# Patient Record
Sex: FEMALE | Race: WHITE | NOT HISPANIC OR LATINO | Employment: OTHER | URBAN - METROPOLITAN AREA
[De-identification: names, ages, dates, MRNs, and addresses within clinical notes are randomized per-mention and may not be internally consistent; named-entity substitution may affect disease eponyms.]

---

## 2017-03-07 ENCOUNTER — TELEPHONE (OUTPATIENT)
Dept: FAMILY MEDICINE | Facility: CLINIC | Age: 82
End: 2017-03-07

## 2017-03-07 NOTE — TELEPHONE ENCOUNTER
----- Message from Cici Winters sent at 3/7/2017  8:20 AM CST -----  Contact: daughter  Daughter - Cristin Valdes - [phone] has scheduled appt with Dr Llanes for Tuesday 03 14 17 in [place] at 2pm but she is needing orders for [facility] to check her iron level/please call daughter to discuss

## 2017-03-08 NOTE — TELEPHONE ENCOUNTER
----- Message from Nuno Beach sent at 3/8/2017  2:37 PM CST -----  Contact: Daughter- Kelsea Aaoq- [phone]  Patient's daughter returning the nurse phone call.Thanks!

## 2017-03-08 NOTE — TELEPHONE ENCOUNTER
Spoke to pt daughter. Advised of procedure for first office visit. Pt daughter verbalized understanding.

## 2017-03-08 NOTE — TELEPHONE ENCOUNTER
Pt needs to be seen first to estab as [facility] pt. Will  Order labs at that time. Pt will be able to get labs prior to future appts.

## 2017-03-14 ENCOUNTER — OFFICE VISIT (OUTPATIENT)
Dept: FAMILY MEDICINE | Facility: CLINIC | Age: 82
End: 2017-03-14
Payer: MEDICARE

## 2017-03-14 VITALS
HEART RATE: 60 BPM | WEIGHT: 137.69 LBS | SYSTOLIC BLOOD PRESSURE: 152 MMHG | TEMPERATURE: 98 F | DIASTOLIC BLOOD PRESSURE: 74 MMHG | RESPIRATION RATE: 16 BRPM | HEIGHT: 62 IN | BODY MASS INDEX: 25.34 KG/M2

## 2017-03-14 DIAGNOSIS — E03.9 HYPOTHYROIDISM, UNSPECIFIED TYPE: ICD-10-CM

## 2017-03-14 DIAGNOSIS — I10 BENIGN ESSENTIAL HTN: Primary | ICD-10-CM

## 2017-03-14 DIAGNOSIS — F41.8 ANXIETY ASSOCIATED WITH DEPRESSION: ICD-10-CM

## 2017-03-14 DIAGNOSIS — D50.9 IRON DEFICIENCY ANEMIA, UNSPECIFIED IRON DEFICIENCY ANEMIA TYPE: ICD-10-CM

## 2017-03-14 DIAGNOSIS — K27.9 PUD (PEPTIC ULCER DISEASE): ICD-10-CM

## 2017-03-14 DIAGNOSIS — E55.9 VITAMIN D DEFICIENCY: ICD-10-CM

## 2017-03-14 DIAGNOSIS — E78.00 PURE HYPERCHOLESTEROLEMIA: ICD-10-CM

## 2017-03-14 PROCEDURE — 99213 OFFICE O/P EST LOW 20 MIN: CPT | Mod: S$GLB,,, | Performed by: INTERNAL MEDICINE

## 2017-03-14 PROCEDURE — 1160F RVW MEDS BY RX/DR IN RCRD: CPT | Mod: S$GLB,,, | Performed by: INTERNAL MEDICINE

## 2017-03-14 PROCEDURE — 1159F MED LIST DOCD IN RCRD: CPT | Mod: S$GLB,,, | Performed by: INTERNAL MEDICINE

## 2017-03-14 PROCEDURE — 1126F AMNT PAIN NOTED NONE PRSNT: CPT | Mod: S$GLB,,, | Performed by: INTERNAL MEDICINE

## 2017-03-14 PROCEDURE — 1157F ADVNC CARE PLAN IN RCRD: CPT | Mod: S$GLB,,, | Performed by: INTERNAL MEDICINE

## 2017-03-14 PROCEDURE — 99999 PR PBB SHADOW E&M-EST. PATIENT-LVL III: CPT | Mod: PBBFAC,,, | Performed by: INTERNAL MEDICINE

## 2017-03-14 RX ORDER — PRAVASTATIN SODIUM 40 MG/1
TABLET ORAL
Refills: 2 | COMMUNITY
Start: 2017-02-17 | End: 2017-04-18 | Stop reason: SDUPTHER

## 2017-03-14 RX ORDER — SERTRALINE HYDROCHLORIDE 50 MG/1
50 TABLET, FILM COATED ORAL DAILY
COMMUNITY
Start: 2017-03-12 | End: 2017-08-03 | Stop reason: SDUPTHER

## 2017-03-14 RX ORDER — FERROUS FUM/VIT C/B12-IF/FOLIC 110-0.5MG
CAPSULE ORAL
Refills: 5 | COMMUNITY
Start: 2017-02-25 | End: 2017-10-03

## 2017-03-14 RX ORDER — BLACK COHOSH ROOT 200 MG
500 CAPSULE ORAL 2 TIMES DAILY
Refills: 5 | COMMUNITY
Start: 2017-02-25 | End: 2017-06-08

## 2017-03-14 RX ORDER — FERROUS GLUCONATE 324(38)MG
1 TABLET ORAL 2 TIMES DAILY
Refills: 5 | COMMUNITY
Start: 2017-02-25 | End: 2017-06-08

## 2017-03-14 RX ORDER — ASPIRIN 81 MG/1
81 TABLET ORAL DAILY
COMMUNITY
End: 2017-03-14

## 2017-03-14 RX ORDER — CHOLECALCIFEROL (VITAMIN D3) 25 MCG
1000 TABLET ORAL DAILY
COMMUNITY

## 2017-03-14 NOTE — PROGRESS NOTES
Subjective:       Patient ID: Margarita Cruz is a 89 y.o. female.    Chief Complaint: Follow-up    HPI a  pleasant 89-year-old woman here with her daughter who is a nurse to get established with me here at the [facility].  She has a history of cognitive impairment hypertension hypercholesterolemia hypothyroidism recent peptic ulcer disease hospitalized discharged around 10/28/16; her PUD which was bleeding of note with her requiring 2 units of packed RBCs during that visit.  Suspected as having iron deficiency anemia with blood loss component as well; on omeprazole 20 mg daily; she's been advised not to take any NSAID use or alcohol except for rare wine; she was seen to me about 6-8 weeks ago and placed on ferry: Daily and ferrous gluconate twice a day for suspected on deficiency anemia.  She has no heartburn or belching or abdominal pain or hematochezia.  She is is on sertraline for anxiety and depression and this is been stable she has not been following her blood pressures at home but watches her salt.  She is not been exercising either.  Lipid levels with therapeutic.  Various diagnosis is with discussed with the patient and her daughter as well who is an RN.  Total time 2: 50 p.m. to 3:15 PM    Review of Systems   Constitutional: Negative for appetite change and fever.   HENT:        No history of seasonal ALLERGIES or perennial ALLERGIES   Eyes: Negative for discharge.   Respiratory: Negative for cough, chest tightness, shortness of breath and wheezing.    Cardiovascular: Negative for chest pain, palpitations and leg swelling.   Gastrointestinal: Negative for abdominal distention, abdominal pain, blood in stool, constipation, diarrhea, nausea and vomiting.        Denies melena as well   Endocrine:        HLP/hypothyroidism     Genitourinary: Negative for dysuria and hematuria.   Musculoskeletal: Negative for arthralgias and myalgias.   Skin: Negative for rash.   Allergic/Immunologic:         "Denies seasonal or perennial ALLERGIES.   Neurological: Negative for tremors, seizures and syncope.   Hematological: Negative for adenopathy.   Psychiatric/Behavioral:         anxiety or depression doing fine w sertraline.   Various diagnosis is clinic care were discussed with the patient as well as her daughter annika     Objective:      Vitals:    03/14/17 1422   BP: (!) 152/74   BP Location: Left arm   Patient Position: Sitting   Pulse: 60   Resp: 16   Temp: 97.7 °F (36.5 °C)   TempSrc: Oral   Weight: 62.5 kg (137 lb 10.8 oz)   Height: 5' 2" (1.575 m)     Body mass index is 25.18 kg/(m^2).    Physical Exam   Constitutional: She is oriented to person, place, and time. She appears well-developed and well-nourished.   HENT:   Head: Normocephalic and atraumatic.   Eyes: EOM are normal.   Neck: Normal range of motion. Neck supple. No thyromegaly present.   No bruits heard.   Cardiovascular: Normal rate, regular rhythm and normal heart sounds.  Exam reveals no gallop.    No murmur heard.  Pulmonary/Chest: Effort normal and breath sounds normal. No respiratory distress. She has no wheezes. She has no rales.   Abdominal: Soft. Bowel sounds are normal. She exhibits no distension. There is no tenderness. There is no rebound and no guarding.   Musculoskeletal: Normal range of motion. She exhibits no edema.   Lymphadenopathy:     She has no cervical adenopathy.   Neurological: She is alert and oriented to person, place, and time.   Moves all 4 extremities fine.   Skin: No rash noted.   Psychiatric: She has a normal mood and affect. Her behavior is normal. Thought content normal.   Vitals reviewed.      Assessment:       1. Benign essential HTN    2. PUD (peptic ulcer disease)    3. Iron deficiency anemia, unspecified iron deficiency anemia type    4. Vitamin D deficiency    5. Pure hypercholesterolemia    6. Hypothyroidism, unspecified type    7. Anxiety associated with depression        Plan:       Benign essential HTN; " moniotor her BP at home. < 2 Gm Na a day.  -     Iron and TIBC; Future; Expected date: 4/25/17  -     Basic metabolic panel; Future; Expected date: 3/14/17  -     Magnesium; Future; Expected date: 3/14/17    PUD (peptic ulcer disease); limit etoh; no NSAID's; cont omeprazole.  Recent hospitalization 9/27/16 through 1028/16 where she required 2 units of packed RBCs for bleeding peptic ulcer disease.              Blood loss contributing towards her anemia.  Placed on omeprazole 20 mg per day at discharge.    Iron deficiency anemia, unspecified iron deficiency anemia type  -     CBC auto differential; Future; Expected date: 3/14/17  -     Ferritin; Future; Expected date: 3/14/17  -     Iron and TIBC; Future; Expected date: 4/25/17    Vitamin D deficiency improved.    Pure hypercholesterolemia; low fat high fiber diet; on pravastatin.    Hypothyroidism, unspecified type; supplements w therapeutic levels; q 6 mos levels.    Anxiety associated with depression; stable w sertraline. Limit caffeine.  Regular weightbearing exercise will help

## 2017-03-14 NOTE — MR AVS SNAPSHOT
[facility]  [address]  [place] [address]  Phone: [phone]  Fax: [phone]                  Margarita Cruz   3/14/2017 2:00 PM   Office Visit    Description:  Female : 1928   Provider:  Bradley Llanes MD   Department:  [facility]           Reason for Visit     Follow-up           Diagnoses this Visit        Comments    Benign essential HTN    -  Primary     PUD (peptic ulcer disease)         Iron deficiency anemia, unspecified iron deficiency anemia type         Vitamin D deficiency         Pure hypercholesterolemia         Hypothyroidism, unspecified type         Anxiety associated with depression                To Do List           Future Appointments        Provider Department Dept Phone    2017 9:20 AM Bradley Llanes MD [facility] [phone]      Goals (5 Years of Data)     None      Follow-Up and Disposition     Return in about 3 weeks (around 2017) for Reassessment and go over her labs.    Follow-up and Disposition History      [facility]     [facility] -  Assistance  Registered nurses in the [facility] provide clinical advisement, health education, appointment booking, and other advisory services.  Call for this free service at [phone].             Medications           Message regarding Medications     Verify the changes and/or additions to your medication regime listed below are the same as discussed with your clinician today.  If any of these changes or additions are incorrect, please notify your healthcare provider.        STOP taking these medications     escitalopram (LEXAPRO) 5 MG Tab Take 5 mg by mouth.      lisinopril (PRINIVIL,ZESTRIL) 2.5 MG tablet Take 2.5 mg by mouth.      atorvastatin (LIPITOR) 10 MG tablet Take 10 mg by mouth.      ibuprofen (ADVIL,MOTRIN) 100 MG tablet Take 100 mg by mouth every 6 (six) hours as needed.      aspirin  "(ECOTRIN) 81 MG EC tablet Take 81 mg by mouth once daily.           Verify that the below list of medications is an accurate representation of the medications you are currently taking.  If none reported, the list may be blank. If incorrect, please contact your healthcare provider. Carry this list with you in case of emergency.           Current Medications     calcium carbonate (OS-MARGARITA) 600 mg (1,500 mg) Tab Take 500 mg by mouth 2 (two) times daily with meals.      FEROCON 110-0.5 mg capsule TAKE ONE CAPSULE BY MOUTH EVERY DAY WITH A MEAL    ferrous gluconate (FERGON) 324 MG tablet Take 1 tablet by mouth 2 (two) times daily.    losartan (COZAAR) 100 MG tablet Take 12.5 mg by mouth once daily.    multivit-iron-min-folic acid (MULTIVITAMIN-IRON-MINERALS-FOLIC ACID) 3,500-18-0.4 unit-mg-mg Chew Take by mouth.      omeprazole (PRILOSEC) 20 MG capsule Take 20 mg by mouth.      pravastatin (PRAVACHOL) 40 MG tablet TAKE 1 TABLET BY MOUTH NIGHLTY FOR 30 DAYS    sertraline (ZOLOFT) 50 MG tablet     VITAMIN C 500 MG tablet Take 500 mg by mouth 2 (two) times daily.    vitamin D 1000 units Tab Take 185 mg by mouth 2 (two) times daily.           Clinical Reference Information           Your Vitals Were     BP Pulse Temp Resp Height Weight    152/74 (BP Location: Left arm, Patient Position: Sitting) 60 97.7 °F (36.5 °C) (Oral) 16 5' 2" (1.575 m) 62.5 kg (137 lb 10.8 oz)    BMI                25.18 kg/m2          Blood Pressure          Most Recent Value    BP  (!)  152/74      Allergies as of 3/14/2017     No Known Allergies      Immunizations Administered on Date of Encounter - 3/14/2017     None      Orders Placed During Today's Visit     Future Labs/Procedures Expected by Expires    Basic metabolic panel  3/14/2017 5/13/2018    CBC auto differential  3/14/2017 5/13/2018    Ferritin  3/14/2017 5/13/2018    Magnesium  3/14/2017 5/13/2018    Iron and TIBC  4/25/2017 5/13/2018      [facility] Sign-Up     Activating your [facility] " account is as easy as 1-2-3!     1) Visit [email or web address], select Sign Up Now, enter this activation code and your date of birth, then select Next.  [email or web address]  Expires: 4/28/2017  2:08 PM      2) Create a username and password to use when you visit [facility] in the future and select a security question in case you lose your password and select Next.    3) Enter your e-mail address and click Sign Up!    Additional Information  If you have questions, please e-mail [email or web address] or call [phone] to talk to our [email or web address] staff. Remember, [facility] is NOT to be used for urgent needs. For medical emergencies, dial 911.         Language Assistance Services     ATTENTION: Language assistance services are available, free of charge. Please call [phone].      ATENCIÓN: Si habla ivet, tiene a hung disposición servicios gratuitos de asistencia lingüística. Llame al [phone].     [facility] Ý: N?u b?n nói Ti?ng Vi?t, có các d?ch v? h? tr? ngôn ng? mi?n phí dành cho b?n. G?i s? [phone].         [facility] complies with applicable Federal civil rights laws and does not discriminate on the basis of race, color, national origin, age, disability, or sex.

## 2017-03-17 ENCOUNTER — TELEPHONE (OUTPATIENT)
Dept: FAMILY MEDICINE | Facility: CLINIC | Age: 82
End: 2017-03-17

## 2017-03-17 NOTE — TELEPHONE ENCOUNTER
----- Message from Jenna Stephenson sent at 3/17/2017 10:52 AM CDT -----  Contact: Eneida Holguin w/[facility] [phone]  She is calling to let you know that this patient has not been there since 2014, so she does not have labs for her.  Thank you!

## 2017-03-21 ENCOUNTER — PATIENT OUTREACH (OUTPATIENT)
Dept: ADMINISTRATIVE | Facility: HOSPITAL | Age: 82
End: 2017-03-21

## 2017-03-21 NOTE — LETTER
March 21, 2017    Margarita Cruz  [address]  [place] [address]             [facility]  [address]  [facility] [address]  Phone: [phone] Dear Mrs. Cruz:    [facility] is committed to your overall health.  To help you get the most out of each of your visits, we will review your information to make sure you are up to date on all of your recommended tests and/or procedures.      Dr. Bradley Llanes is a new provider to us and we may not have your complete medical records on file here at [facility].  We have requested his records from the [facility], but we have found that you may be due for your fasting cholesterol labs, osteoporosis screening, and possibly some immunizations (flu, pneumonia, shingles, and tetanus).     If you have had any of the above done at another facility, please bring the records or information with you so that your record at [facility] will be complete.    If you are currently taking medication, please bring it with you to your appointment for review.    Also, if you have any type of Advanced Directives, please bring them with you to your office visit so we may scan them into your chart.    If you have any questions or concerns, please don't hesitate to call.    Thank you for letting us care for you,  Lizzie Kang LPN Clinical Care Coordinator  [facility] [place]  [phone]

## 2017-03-28 LAB
BASOPHILS # BLD AUTO: 36 CELLS/UL (ref 0–200)
BASOPHILS NFR BLD AUTO: 0.5 %
BUN SERPL-MCNC: 14 MG/DL (ref 7–25)
BUN/CREAT SERPL: ABNORMAL (CALC) (ref 6–22)
CALCIUM SERPL-MCNC: 10.1 MG/DL (ref 8.6–10.4)
CHLORIDE SERPL-SCNC: 102 MMOL/L (ref 98–110)
CO2 SERPL-SCNC: 29 MMOL/L (ref 20–31)
CREAT SERPL-MCNC: 0.83 MG/DL (ref 0.6–0.88)
EOSINOPHIL # BLD AUTO: 202 CELLS/UL (ref 15–500)
EOSINOPHIL NFR BLD AUTO: 2.8 %
ERYTHROCYTE [DISTWIDTH] IN BLOOD BY AUTOMATED COUNT: 32.4 % (ref 11–15)
FERRITIN SERPL-MCNC: 36 NG/ML (ref 20–288)
GFR SERPL CREATININE-BSD FRML MDRD: 63 ML/MIN/1.73M2
GLUCOSE SERPL-MCNC: 118 MG/DL (ref 65–99)
HCT VFR BLD AUTO: 42.5 % (ref 35–45)
HGB BLD-MCNC: 13.3 G/DL (ref 11.7–15.5)
IRON SATN MFR SERPL: 28 % (CALC) (ref 11–50)
IRON SERPL-MCNC: 99 MCG/DL (ref 45–160)
LYMPHOCYTES # BLD AUTO: 3139 CELLS/UL (ref 850–3900)
LYMPHOCYTES NFR BLD AUTO: 43.6 %
MAGNESIUM SERPL-MCNC: 2 MG/DL (ref 1.5–2.5)
MCH RBC QN AUTO: 24.5 PG (ref 27–33)
MCHC RBC AUTO-ENTMCNC: 31.3 G/DL (ref 32–36)
MCV RBC AUTO: 78.5 FL (ref 80–100)
MONOCYTES # BLD AUTO: 288 CELLS/UL (ref 200–950)
MONOCYTES NFR BLD AUTO: 4 %
NEUTROPHILS # BLD AUTO: 3535 CELLS/UL (ref 1500–7800)
NEUTROPHILS NFR BLD AUTO: 49.1 %
PLATELET # BLD AUTO: 238 THOUSAND/UL (ref 140–400)
PMV BLD REES-ECKER: 10.5 FL (ref 7.5–12.5)
POTASSIUM SERPL-SCNC: 4.7 MMOL/L (ref 3.5–5.3)
RBC # BLD AUTO: 5.42 MILLION/UL (ref 3.8–5.1)
SERVICE CMNT-IMP: ABNORMAL
SODIUM SERPL-SCNC: 139 MMOL/L (ref 135–146)
TIBC SERPL-MCNC: 360 MCG/DL (CALC) (ref 250–450)
WBC # BLD AUTO: 7.2 THOUSAND/UL (ref 3.8–10.8)

## 2017-04-04 ENCOUNTER — OFFICE VISIT (OUTPATIENT)
Dept: FAMILY MEDICINE | Facility: CLINIC | Age: 82
End: 2017-04-04
Payer: MEDICARE

## 2017-04-04 VITALS
OXYGEN SATURATION: 98 % | HEIGHT: 62 IN | DIASTOLIC BLOOD PRESSURE: 75 MMHG | SYSTOLIC BLOOD PRESSURE: 130 MMHG | WEIGHT: 135.06 LBS | HEART RATE: 67 BPM | TEMPERATURE: 97 F | BODY MASS INDEX: 24.85 KG/M2

## 2017-04-04 DIAGNOSIS — Z87.19 HISTORY OF HEMATEMESIS: ICD-10-CM

## 2017-04-04 DIAGNOSIS — E55.9 VITAMIN D DEFICIENCY: ICD-10-CM

## 2017-04-04 DIAGNOSIS — K27.9 PUD (PEPTIC ULCER DISEASE): Primary | ICD-10-CM

## 2017-04-04 DIAGNOSIS — D50.8 IRON DEFICIENCY ANEMIA SECONDARY TO INADEQUATE DIETARY IRON INTAKE: ICD-10-CM

## 2017-04-04 DIAGNOSIS — I10 ESSENTIAL HYPERTENSION: ICD-10-CM

## 2017-04-04 DIAGNOSIS — F03.90 SENILE DEMENTIA, WITHOUT BEHAVIORAL DISTURBANCE: ICD-10-CM

## 2017-04-04 DIAGNOSIS — R41.89 COGNITIVE IMPAIRMENT: ICD-10-CM

## 2017-04-04 DIAGNOSIS — E78.00 PURE HYPERCHOLESTEROLEMIA: ICD-10-CM

## 2017-04-04 DIAGNOSIS — F41.8 ANXIETY WITH DEPRESSION: ICD-10-CM

## 2017-04-04 PROCEDURE — 1160F RVW MEDS BY RX/DR IN RCRD: CPT | Mod: S$GLB,,, | Performed by: INTERNAL MEDICINE

## 2017-04-04 PROCEDURE — 1157F ADVNC CARE PLAN IN RCRD: CPT | Mod: S$GLB,,, | Performed by: INTERNAL MEDICINE

## 2017-04-04 PROCEDURE — 1159F MED LIST DOCD IN RCRD: CPT | Mod: S$GLB,,, | Performed by: INTERNAL MEDICINE

## 2017-04-04 PROCEDURE — 99999 PR PBB SHADOW E&M-EST. PATIENT-LVL III: CPT | Mod: PBBFAC,,, | Performed by: INTERNAL MEDICINE

## 2017-04-04 PROCEDURE — 99214 OFFICE O/P EST MOD 30 MIN: CPT | Mod: S$GLB,,, | Performed by: INTERNAL MEDICINE

## 2017-04-04 PROCEDURE — 1126F AMNT PAIN NOTED NONE PRSNT: CPT | Mod: S$GLB,,, | Performed by: INTERNAL MEDICINE

## 2017-04-04 RX ORDER — DONEPEZIL HYDROCHLORIDE 5 MG/1
5 TABLET, FILM COATED ORAL NIGHTLY
Qty: 30 TABLET | Refills: 2 | Status: SHIPPED | OUTPATIENT
Start: 2017-04-04 | End: 2017-07-03 | Stop reason: SDUPTHER

## 2017-04-04 NOTE — MR AVS SNAPSHOT
[facility]  [address]  [place] [address]  Phone: [phone]  Fax: [phone]                  Margarita Cruz   2017 9:20 AM   Office Visit    Description:  Female : 1928   Provider:  Bradley Llanes MD   Department:  [facility]           Reason for Visit     Follow-up           Diagnoses this Visit        Comments    PUD (peptic ulcer disease)    -  Primary     Pure hypercholesterolemia         Vitamin D deficiency         Anxiety with depression         Iron deficiency anemia secondary to inadequate dietary iron intake         Cognitive impairment                To Do List           Future Appointments        Provider Department Dept Phone    2017 1:00 PM Bradley Llanes MD [facility] [phone]      Goals (5 Years of Data)     None      Follow-Up and Disposition     Return in about 2 months (around 2017) for Reassessment and go over her labs.       These Medications        Disp Refills Start End    donepezil (ARICEPT) 5 MG tablet 30 tablet 2 2017    Take 1 tablet (5 mg total) by mouth every evening. - Oral    Pharmacy: [facility]/pharmacy #[id number] - [place], [place] - [address]  #: [phone]         [facility]     [facility] On Call Nurse Care Line -  Assistance  Unless otherwise directed by your provider, please contact [facility], our nurse care line that is available for  assistance.     Registered nurses in the [facility] provide: appointment scheduling, clinical advisement, health education, and other advisory services.  Call: [phone] (toll free)               Medications           Message regarding Medications     Verify the changes and/or additions to your medication regime listed below are the same as discussed with your clinician today.  If any of these changes or additions are incorrect, please notify your healthcare provider.        START taking  "these NEW medications        Refills    donepezil (ARICEPT) 5 MG tablet 2    Sig: Take 1 tablet (5 mg total) by mouth every evening.    Class: Normal    Route: Oral           Verify that the below list of medications is an accurate representation of the medications you are currently taking.  If none reported, the list may be blank. If incorrect, please contact your healthcare provider. Carry this list with you in case of emergency.           Current Medications     calcium carbonate (OS-MARGARITA) 600 mg (1,500 mg) Tab Take 600 mg by mouth 2 (two) times daily with meals.     FEROCON 110-0.5 mg capsule TAKE ONE CAPSULE BY MOUTH EVERY DAY WITH A MEAL    ferrous gluconate (FERGON) 324 MG tablet Take 1 tablet by mouth 2 (two) times daily.    losartan (COZAAR) 100 MG tablet Take 12.5 mg by mouth once daily.    multivit-iron-min-folic acid (MULTIVITAMIN-IRON-MINERALS-FOLIC ACID) 3,500-18-0.4 unit-mg-mg Chew Take by mouth once daily.     omeprazole (PRILOSEC) 20 MG capsule Take 20 mg by mouth once daily.     pravastatin (PRAVACHOL) 40 MG tablet TAKE 1 TABLET BY MOUTH NIGHLTY FOR 30 DAYS    sertraline (ZOLOFT) 50 MG tablet Take 50 mg by mouth once daily.     VITAMIN C 500 MG tablet Take 500 mg by mouth 2 (two) times daily.    vitamin D 1000 units Tab Take 1,000 mg by mouth once daily.     donepezil (ARICEPT) 5 MG tablet Take 1 tablet (5 mg total) by mouth every evening.           Clinical Reference Information           Your Vitals Were     BP Pulse Temp Height Weight SpO2    130/75 (BP Location: Left arm, Patient Position: Sitting, BP Method: Manual) 67 97.4 °F (36.3 °C) (Oral) 5' 2" (1.575 m) 61.3 kg (135 lb 0.5 oz) 98%    BMI                24.7 kg/m2          Blood Pressure          Most Recent Value    BP  130/75      Allergies as of 4/4/2017     No Known Allergies      Immunizations Administered on Date of Encounter - 4/4/2017     None      Orders Placed During Today's Visit     Future Labs/Procedures Expected by Expires    " CBC auto differential  4/4/2017 6/3/2018    Comprehensive metabolic panel  4/4/2017 6/3/2018    Ferritin  4/4/2017 6/3/2018    Iron and TIBC  4/4/2017 6/3/2018    Lipid panel  4/4/2017 6/3/2018    T4, free  4/4/2017 6/3/2018    TSH  4/4/2017 6/3/2018      [facility] Sign-Up     Activating your [facility] account is as easy as 1-2-3!     1) Visit [email or web address], select Sign Up Now, enter this activation code and your date of birth, then select Next.  [email or web address]  Expires: 4/28/2017  2:08 PM      2) Create a username and password to use when you visit [facility] in the future and select a security question in case you lose your password and select Next.    3) Enter your e-mail address and click Sign Up!    Additional Information  If you have questions, please e-mail [email or web address] or call [phone] to talk to our [facility] staff. Remember, [facility] is NOT to be used for urgent needs. For medical emergencies, dial 911.         Language Assistance Services     ATTENTION: Language assistance services are available, free of charge. Please call [phone].      ATENCIÓN: Si habla español, tiene a hung disposición servicios gratuitos de asistencia lingüística. Llame al [phone].     CHÚ Ý: N?u b?n nói Ti?ng Vi?t, có các d?ch v? h? tr? ngôn ng? mi?n phí dành cho b?n. G?i s? [phone].         [facility] complies with applicable Federal civil rights laws and does not discriminate on the basis of race, color, national origin, age, disability, or sex.

## 2017-04-04 NOTE — PROGRESS NOTES
Subjective:       Patient ID: Margarita Cruz is a 89 y.o. female.    Chief Complaint: Follow-up (labs in computer )    HPI very pleasant 89-year-old lady presents with her daughter today to go over lab results.  Patient is on omeprazole for peptic ulcer disease with an earlier history of hematemesis.  She has no complaints of nausea and vomiting, heartburn, and belching, or abdominal pain.  Her labs were discussed with the patient and her daughter at length who is an RN.  She is not anemic, but she does have low normal ferritin levels, and still with a reduced MCV and elevated RDW reflective of some component of iron deficiency still.  Her anxiety and depression is doing fine on Zoloft supplements; they wish to continue it.  She is tolerating her pravastatin fine for hyperlipidemia, and she is on a low-fat high-fiber diet.  We'll continue her omeprazole daily.  She does still have some chronic short-term memory deficit and her daughter would like her to be placed on something to try and see if she could benefit.  She has seen neurology Dr. Russell in the past about 2 years ago; had an MRI of the head in the past with reported negative workup.  No meds were  recommended at that time, roughly about 2 years ago, with Dr. Russell's impression was that of senile dementia.    Review of Systems   Constitutional: Negative for appetite change and fever.   HENT:        No mentio of history of seasonal ALLERGIES or perennial ALLERGIES   Eyes: Negative for discharge.   Respiratory: Negative for cough, chest tightness, shortness of breath and wheezing.    Cardiovascular: Negative for chest pain, palpitations and leg swelling.   Gastrointestinal: Negative for abdominal distention, abdominal pain, blood in stool, constipation, diarrhea, nausea and vomiting.        Denies melena as well   Genitourinary: Negative for dysuria and hematuria.   Musculoskeletal: Negative for arthralgias and myalgias.   Skin: Negative for rash.  "  Allergic/Immunologic:        Denies seasonal or perennial ALLERGIES.   Neurological: Negative for tremors, seizures and syncope.   Hematological: Negative for adenopathy. Does not bruise/bleed easily.   Psychiatric/Behavioral:         Anxiety/ depression doing fine w zoloft.       Objective:      Vitals:    04/04/17 0931   BP: 130/75   BP Location: Left arm   Patient Position: Sitting   BP Method: Manual   Pulse: 67   Temp: 97.4 °F (36.3 °C)   TempSrc: Oral   SpO2: 98%   Weight: 61.3 kg (135 lb 0.5 oz)   Height: 5' 2" (1.575 m)     Body mass index is 24.7 kg/(m^2).    Physical Exam   Constitutional: She is oriented to person, place, and time. She appears well-developed and well-nourished.   HENT:   Head: Normocephalic and atraumatic.   Eyes: EOM are normal.   Neck: Normal range of motion. Neck supple. No thyromegaly present.   Cardiovascular: Normal rate, regular rhythm and normal heart sounds.  Exam reveals no gallop.    No murmur heard.  Pulmonary/Chest: Effort normal and breath sounds normal. No respiratory distress. She has no wheezes. She has no rales.   Abdominal: Soft. Bowel sounds are normal. She exhibits no distension. There is no tenderness. There is no rebound and no guarding.   Musculoskeletal: Normal range of motion. She exhibits no edema.   Lymphadenopathy:     She has no cervical adenopathy.   Neurological: She is alert and oriented to person, place, and time.   Moves all 4 extremities fine.   Skin: No rash noted.   Psychiatric: She has a normal mood and affect. Her behavior is normal. Thought content normal.   Vitals reviewed.      Assessment:       1. PUD (peptic ulcer disease)    2. History of hematemesis    3. Iron deficiency anemia secondary to inadequate dietary iron intake    4. Essential hypertension    5. Pure hypercholesterolemia    6. Anxiety with depression    7. Cognitive impairment    8. Senile dementia, without behavioral disturbance    9. Vitamin D deficiency        Plan:       PUD " (peptic ulcer disease); continue omeprazole 20 mg by mouth daily; has been asymptomatic  -     CBC auto differential; Future; Expected date: 4/4/17    History of hematemesis; blood loss likely contributing towards anemia earlier    Iron deficiency anemia secondary to inadequate dietary iron intake; can DC her fair: And reduce her ferrous gluconate to 324 mg by mouth daily  -     CBC auto differential; Future; Expected date: 4/4/17  -     Ferritin; Future; Expected date: 4/4/17  -     Iron and TIBC; Future; Expected date: 4/4/17    Essential hypertension; Maintain < 2 Gm Na a day diet, and monitor BP at home; keep a log.  Continue her losartan daily as well.    Pure hypercholesterolemia; Maintain low fat high fiber diet, exercise regularly.  Continue her pravastatin daily  -     Comprehensive metabolic panel; Future; Expected date: 4/4/17  -     T4, free; Future; Expected date: 4/4/17  -     TSH; Future; Expected date: 4/4/17  -     Lipid panel; Future; Expected date: 4/4/17    Anxiety with depression; continue her sertraline 50 mg by mouth daily which appears to be helping her  -     T4, free; Future; Expected date: 4/4/17  -     TSH; Future; Expected date: 4/4/17    Cognitive impairment  -     donepezil (ARICEPT) 5 MG tablet; Take 1 tablet (5 mg total) by mouth every evening.  Dispense: 30 tablet; Refill: 2  Senile dementia, without behavioral disturbance    Vitamin D deficiency

## 2017-04-18 RX ORDER — PRAVASTATIN SODIUM 40 MG/1
40 TABLET ORAL NIGHTLY
Qty: 30 TABLET | Refills: 2 | Status: SHIPPED | OUTPATIENT
Start: 2017-04-18 | End: 2017-07-14 | Stop reason: SDUPTHER

## 2017-05-25 ENCOUNTER — PATIENT OUTREACH (OUTPATIENT)
Dept: ADMINISTRATIVE | Facility: HOSPITAL | Age: 82
End: 2017-05-25

## 2017-05-25 NOTE — LETTER
May 25, 2017    Margarita Crzu  [address] [facility]  [place] [address]             [facility]  [address]  [facility] [address]  Phone: [phone] Dear Mrs. Cruz:    [facility] is committed to your overall health.  To help you get the most out of each of your visits, we will review your information to make sure you are up to date on all of your recommended tests and/or procedures.      Dr. Bradley Llanes has found that you may be due for your fasting cholesterol labs, osteoporosis screening, and possibly some immunizations (pneumonia, shingles, and tetanus).     Medicare does not cover all immunizations to be given in the clinic.  Check your benefits to ensure that you do not need to receive your immunizations at the pharmacy.    If you have had any of the above done at another facility, please bring the records or information with you so that your record at [facility] will be complete.  If you would like to schedule any of these, please contact me.    If you are currently taking medication, please bring it with you to your appointment for review.    Also, if you have any type of Advanced Directives, please bring them with you to your office visit so we may scan them into your chart.    If you have any questions or concerns, please don't hesitate to call.    Thank you for letting us care for you,  Lizzie Kang LPN Clinical Care Coordinator  [facility] [place]  [phone]

## 2017-06-06 LAB
ALBUMIN SERPL-MCNC: 3.9 G/DL (ref 3.6–5.1)
ALBUMIN/GLOB SERPL: 1.4 (CALC) (ref 1–2.5)
ALP SERPL-CCNC: 75 U/L (ref 33–130)
ALT SERPL-CCNC: 12 U/L (ref 6–29)
AST SERPL-CCNC: 17 U/L (ref 10–35)
BASOPHILS # BLD AUTO: 8 CELLS/UL (ref 0–200)
BASOPHILS NFR BLD AUTO: 0.1 %
BILIRUB SERPL-MCNC: 0.5 MG/DL (ref 0.2–1.2)
BUN SERPL-MCNC: 27 MG/DL (ref 7–25)
BUN/CREAT SERPL: 33 (CALC) (ref 6–22)
CALCIUM SERPL-MCNC: 9.7 MG/DL (ref 8.6–10.4)
CHLORIDE SERPL-SCNC: 102 MMOL/L (ref 98–110)
CHOLEST SERPL-MCNC: 162 MG/DL (ref 125–200)
CHOLEST/HDLC SERPL: 2.6 (CALC)
CO2 SERPL-SCNC: 30 MMOL/L (ref 20–31)
CREAT SERPL-MCNC: 0.82 MG/DL (ref 0.6–0.88)
EOSINOPHIL # BLD AUTO: 288 CELLS/UL (ref 15–500)
EOSINOPHIL NFR BLD AUTO: 3.6 %
ERYTHROCYTE [DISTWIDTH] IN BLOOD BY AUTOMATED COUNT: 14.1 % (ref 11–15)
FERRITIN SERPL-MCNC: 54 NG/ML (ref 20–288)
GFR SERPL CREATININE-BSD FRML MDRD: 63 ML/MIN/1.73M2
GLOBULIN SER CALC-MCNC: 2.7 G/DL (CALC) (ref 1.9–3.7)
GLUCOSE SERPL-MCNC: 105 MG/DL (ref 65–99)
HCT VFR BLD AUTO: 40.8 % (ref 35–45)
HDLC SERPL-MCNC: 63 MG/DL
HGB BLD-MCNC: 13.4 G/DL (ref 11.7–15.5)
IRON SATN MFR SERPL: 22 % (CALC) (ref 11–50)
IRON SERPL-MCNC: 73 MCG/DL (ref 45–160)
LDLC SERPL CALC-MCNC: 77 MG/DL (CALC)
LYMPHOCYTES # BLD AUTO: 2848 CELLS/UL (ref 850–3900)
LYMPHOCYTES NFR BLD AUTO: 35.6 %
MCH RBC QN AUTO: 28.9 PG (ref 27–33)
MCHC RBC AUTO-ENTMCNC: 32.9 G/DL (ref 32–36)
MCV RBC AUTO: 87.9 FL (ref 80–100)
MONOCYTES # BLD AUTO: 512 CELLS/UL (ref 200–950)
MONOCYTES NFR BLD AUTO: 6.4 %
NEUTROPHILS # BLD AUTO: 4344 CELLS/UL (ref 1500–7800)
NEUTROPHILS NFR BLD AUTO: 54.3 %
NONHDLC SERPL-MCNC: 99 MG/DL (CALC)
PLATELET # BLD AUTO: 248 THOUSAND/UL (ref 140–400)
PMV BLD REES-ECKER: 9.6 FL (ref 7.5–12.5)
POTASSIUM SERPL-SCNC: 4.4 MMOL/L (ref 3.5–5.3)
PROT SERPL-MCNC: 6.6 G/DL (ref 6.1–8.1)
RBC # BLD AUTO: 4.64 MILLION/UL (ref 3.8–5.1)
SODIUM SERPL-SCNC: 140 MMOL/L (ref 135–146)
T4 FREE SERPL-MCNC: 1.2 NG/DL (ref 0.8–1.8)
TIBC SERPL-MCNC: 338 MCG/DL (CALC) (ref 250–450)
TRIGL SERPL-MCNC: 110 MG/DL
TSH SERPL-ACNC: 0.38 MIU/L (ref 0.4–4.5)
WBC # BLD AUTO: 8 THOUSAND/UL (ref 3.8–10.8)

## 2017-06-08 ENCOUNTER — OFFICE VISIT (OUTPATIENT)
Dept: FAMILY MEDICINE | Facility: CLINIC | Age: 82
End: 2017-06-08
Payer: MEDICARE

## 2017-06-08 VITALS
OXYGEN SATURATION: 96 % | HEIGHT: 62 IN | DIASTOLIC BLOOD PRESSURE: 85 MMHG | WEIGHT: 134.38 LBS | HEART RATE: 73 BPM | BODY MASS INDEX: 24.73 KG/M2 | TEMPERATURE: 98 F | SYSTOLIC BLOOD PRESSURE: 136 MMHG

## 2017-06-08 DIAGNOSIS — R79.89 PRERENAL AZOTEMIA: ICD-10-CM

## 2017-06-08 DIAGNOSIS — E78.00 PURE HYPERCHOLESTEROLEMIA: ICD-10-CM

## 2017-06-08 DIAGNOSIS — F41.8 ANXIETY WITH DEPRESSION: ICD-10-CM

## 2017-06-08 DIAGNOSIS — H00.015 HORDEOLUM EXTERNUM OF LEFT LOWER EYELID: ICD-10-CM

## 2017-06-08 DIAGNOSIS — E55.9 VITAMIN D DEFICIENCY: ICD-10-CM

## 2017-06-08 DIAGNOSIS — I10 ESSENTIAL HYPERTENSION: ICD-10-CM

## 2017-06-08 DIAGNOSIS — R73.01 IMPAIRED FASTING GLUCOSE: ICD-10-CM

## 2017-06-08 DIAGNOSIS — F03.90 SENILE DEMENTIA WITHOUT BEHAVIORAL DISTURBANCE: Primary | ICD-10-CM

## 2017-06-08 DIAGNOSIS — M81.0 AGE-RELATED OSTEOPOROSIS WITHOUT CURRENT PATHOLOGICAL FRACTURE: ICD-10-CM

## 2017-06-08 DIAGNOSIS — D50.8 IRON DEFICIENCY ANEMIA SECONDARY TO INADEQUATE DIETARY IRON INTAKE: ICD-10-CM

## 2017-06-08 PROBLEM — H00.019 STYE: Status: ACTIVE | Noted: 2017-06-08

## 2017-06-08 PROCEDURE — 1159F MED LIST DOCD IN RCRD: CPT | Mod: S$GLB,,, | Performed by: INTERNAL MEDICINE

## 2017-06-08 PROCEDURE — 99214 OFFICE O/P EST MOD 30 MIN: CPT | Mod: S$GLB,,, | Performed by: INTERNAL MEDICINE

## 2017-06-08 PROCEDURE — 99999 PR PBB SHADOW E&M-EST. PATIENT-LVL IV: CPT | Mod: PBBFAC,,, | Performed by: INTERNAL MEDICINE

## 2017-06-08 PROCEDURE — 1126F AMNT PAIN NOTED NONE PRSNT: CPT | Mod: S$GLB,,, | Performed by: INTERNAL MEDICINE

## 2017-06-08 RX ORDER — SULFACETAMIDE SODIUM 100 MG/ML
2 SOLUTION/ DROPS OPHTHALMIC 4 TIMES DAILY
Qty: 15 ML | Refills: 0 | Status: SHIPPED | OUTPATIENT
Start: 2017-06-08 | End: 2017-10-03

## 2017-06-08 NOTE — PATIENT INSTRUCTIONS
[facility] for her labs; DEXA before followup; continue weight bearing exercises. Continue her calcium and vit D supplements. Return in 3 months for follow-up.

## 2017-06-08 NOTE — PROGRESS NOTES
Subjective:       Patient ID: Margarita Cruz is a 89 y.o. female.    Chief Complaint: follow up labs    HPI  patient's here with her daughter for reassessment and to go over her labs.  For her pure hypercholesterolemia she is on low-fat high-fiber diet tolerates pravastatin fine.  She has iron deficiency anemia which is improved she's no longer anemic she is to continue on her Ferocon to improve her iron stores.  For her senile dementia or memory is been about the same she is on Aricept 5 mg per day and she has seen Dr. Russell is her neurologist.  Also has a stye on the left lower eyelid that she didn't like to receive treatment for.  Lab results were discussed with the patient and her daughter Radha at length.  She has a mild prerenal azotemia with a BUN 27 and a creatinine 0.8 to a GFR is low normal at 63; lipid panel was therapeutic; a fasting blood sugar was minimally elevated at 105.  LFTs were normal her cholesterol was 162, HDL 63, triglyceride 110, LDL 77.  It's on 40 mg of pravastatin.  Iron levels show slight increase in ferritin 54 with iron slightly reduced at 73 from her previous reading and decreased iron saturation to 22%.  She also has a history of osteoporosis needs to be per dissipated and weight bearing exercises about 5 times a week;continue her calcium and vitamin D; she does not want miacalcin nasal spray or Fosamax; her DEXA does need to be updated.    Review of Systems   Constitutional: Negative for appetite change, fever and unexpected weight change.   HENT: Negative for postnasal drip, rhinorrhea and sinus pressure.         Eyes history of seasonal ALLERGIES or perennial ALLERGIES   Eyes: Positive for itching. Negative for discharge.        L lid stye.   Respiratory: Negative for cough, chest tightness, shortness of breath and wheezing.    Cardiovascular: Negative for chest pain, palpitations and leg swelling.   Gastrointestinal: Negative for abdominal distention, abdominal pain, blood in  "stool, constipation, diarrhea, nausea and vomiting.        Denies melena as well   Endocrine: Negative for polydipsia, polyphagia and polyuria.   Genitourinary: Negative for dysuria, hematuria and urgency.   Musculoskeletal: Negative for arthralgias and myalgias.   Skin: Negative for rash.   Allergic/Immunologic: Negative for environmental allergies, food allergies and immunocompromised state.        Denies seasonal or perennial ALLERGIES.   Neurological: Negative for tremors, seizures and syncope.   Hematological: Negative for adenopathy. Bruises/bleeds easily.   Psychiatric/Behavioral:        Denies anxiety or depression       Objective:      Vitals:    06/08/17 1306   BP: 136/85   BP Location: Left arm   Patient Position: Sitting   BP Method: Manual   Pulse: 73   Temp: 98 °F (36.7 °C)   TempSrc: Oral   SpO2: 96%   Weight: 61 kg (134 lb 5.9 oz)   Height: 5' 2" (1.575 m)     Body mass index is 24.58 kg/m².    Physical Exam   Constitutional: She is oriented to person, place, and time. She appears well-developed and well-nourished.   HENT:   Head: Normocephalic and atraumatic.   Eyes: EOM are normal.   Neck: Normal range of motion. Neck supple. No thyromegaly present.   Cardiovascular: Normal rate, regular rhythm and normal heart sounds.  Exam reveals no gallop.    No murmur heard.  Pulmonary/Chest: Effort normal and breath sounds normal. No respiratory distress. She has no wheezes. She has no rales.   Abdominal: Soft. Bowel sounds are normal. She exhibits no distension. There is no tenderness. There is no rebound and no guarding.   Musculoskeletal: Normal range of motion. She exhibits no edema.   Lymphadenopathy:     She has no cervical adenopathy.   Neurological: She is alert and oriented to person, place, and time.   Moves all 4 extremities fine.   Skin: No rash noted.   Psychiatric: She has a normal mood and affect. Her behavior is normal. Thought content normal.   Vitals reviewed.      Assessment:       1. " Senile dementia without behavioral disturbance    2. Anxiety with depression    3. Pure hypercholesterolemia    4. Impaired fasting glucose    5. Essential hypertension    6. Iron deficiency anemia secondary to inadequate dietary iron intake    7. Hordeolum externum of left lower eyelid    8. Age-related osteoporosis without current pathological fracture    9. Vitamin D deficiency    10. 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  Schedule follow-up with neurology for reassessment of her dementia.    Pure hypercholesterolemia; Maintain low fat high fiber diet, exercise regularly. Cont pravastatin; decrease the dose to 20 mg per day    Impaired fasting glucose; follow hemoglobin A1c periodically with a fasting blood sugar    Essential hypertension; continue low-salt diet; she is also on losartan 12.5 mg daily; monitor blood pressure at home and keep a log    Iron deficiency anemia secondary to inadequate dietary iron intake; anemia resolved now. Cont ferocon; follow iron levels    Hordeolum externum of left lower eyelid; sodium sulamyd ophth sol.  Warm compresses    Age-related osteoporosis without current pathological fracture; DEXA scan update needed.      Weight bearing exercises, continue calcium and vit D supplements.      Doesn't want miacalcin or fosamax.    Prerenal azotemia; to try and increase her fluid intake daily: minimum 6-7 glasses of fluid per day    Anxiety with depression; stable on zoloft

## 2017-06-10 ENCOUNTER — TELEPHONE (OUTPATIENT)
Dept: FAMILY MEDICINE | Facility: CLINIC | Age: 82
End: 2017-06-10

## 2017-06-10 PROBLEM — R73.01 IMPAIRED FASTING GLUCOSE: Status: ACTIVE | Noted: 2017-06-10

## 2017-06-10 PROBLEM — R79.89 PRERENAL AZOTEMIA: Status: ACTIVE | Noted: 2017-06-10

## 2017-06-10 NOTE — TELEPHONE ENCOUNTER
Outpatient started Radha Holloway that after review of her lipid panel we can reduce her pravastatin from 40 mg to 20 mg a day;  she can break the 40s and half; call us when she needs us to send in the 20s; ask her daughter also if we'll however tried stopping the pravastatin to see if it makes a difference in her memory; I think we have and it didn't make any difference but just like to clarify it

## 2017-06-12 ENCOUNTER — TELEPHONE (OUTPATIENT)
Dept: FAMILY MEDICINE | Facility: CLINIC | Age: 82
End: 2017-06-12

## 2017-06-12 NOTE — TELEPHONE ENCOUNTER
Spoke with Cristin ( daughter) and informed her per Dr. Llanes prior notes to decrease Pravastatin from 40 mg to 20 mg a day.

## 2017-06-12 NOTE — TELEPHONE ENCOUNTER
----- Message from Deuce Quinonez sent at 6/12/2017  2:52 PM CDT -----  Daughter Cristin called back returning a call back for the pt/pls call back at [phone]

## 2017-07-03 DIAGNOSIS — R41.89 COGNITIVE IMPAIRMENT: ICD-10-CM

## 2017-07-05 DIAGNOSIS — R41.89 COGNITIVE IMPAIRMENT: ICD-10-CM

## 2017-07-05 RX ORDER — DONEPEZIL HYDROCHLORIDE 5 MG/1
5 TABLET, FILM COATED ORAL NIGHTLY
Qty: 30 TABLET | Refills: 2 | Status: SHIPPED | OUTPATIENT
Start: 2017-07-05 | End: 2017-10-03 | Stop reason: SDUPTHER

## 2017-07-05 RX ORDER — DONEPEZIL HYDROCHLORIDE 5 MG/1
5 TABLET, FILM COATED ORAL NIGHTLY
Qty: 30 TABLET | Refills: 3 | Status: SHIPPED | OUTPATIENT
Start: 2017-07-05 | End: 2017-11-02

## 2017-07-14 RX ORDER — PRAVASTATIN SODIUM 40 MG/1
40 TABLET ORAL NIGHTLY
Qty: 30 TABLET | Refills: 3 | Status: SHIPPED | OUTPATIENT
Start: 2017-07-14 | End: 2018-04-24 | Stop reason: SDUPTHER

## 2017-08-03 RX ORDER — SERTRALINE HYDROCHLORIDE 50 MG/1
TABLET, FILM COATED ORAL
Qty: 30 TABLET | Refills: 3 | Status: SHIPPED | OUTPATIENT
Start: 2017-08-03 | End: 2017-11-18 | Stop reason: SDUPTHER

## 2017-09-05 ENCOUNTER — TELEPHONE (OUTPATIENT)
Dept: FAMILY MEDICINE | Facility: CLINIC | Age: 82
End: 2017-09-05

## 2017-09-05 NOTE — TELEPHONE ENCOUNTER
Spoke with Jess ( daughter) and informed her that lab orders have been switched from [facility] to [facility] and that the provider would have to reorder labs to be sent back to [facility]; daughter said just leave the labs at [facility] this time and then they will get labs at [facility] thereafter.

## 2017-09-05 NOTE — TELEPHONE ENCOUNTER
----- Message from Gale Davies sent at 9/5/2017  1:06 PM CDT -----  Contact: daughter-Jess Valdes  Patient would like to have labs done at the clinic instead of at [email or web address]. Please call back at

## 2017-09-19 ENCOUNTER — PATIENT OUTREACH (OUTPATIENT)
Dept: ADMINISTRATIVE | Facility: HOSPITAL | Age: 82
End: 2017-09-19

## 2017-09-19 LAB
BUN SERPL-MCNC: 16 MG/DL (ref 7–25)
BUN/CREAT SERPL: 18 (CALC) (ref 6–22)
CALCIUM SERPL-MCNC: 9.9 MG/DL (ref 8.6–10.4)
CHLORIDE SERPL-SCNC: 104 MMOL/L (ref 98–110)
CO2 SERPL-SCNC: 28 MMOL/L (ref 20–31)
CREAT SERPL-MCNC: 0.89 MG/DL (ref 0.6–0.88)
GFR SERPL CREATININE-BSD FRML MDRD: 57 ML/MIN/1.73M2
GLUCOSE SERPL-MCNC: 108 MG/DL (ref 65–99)
POTASSIUM SERPL-SCNC: 4.3 MMOL/L (ref 3.5–5.3)
SODIUM SERPL-SCNC: 140 MMOL/L (ref 135–146)

## 2017-09-19 NOTE — LETTER
September 19, 2017    Margarita Cruz  [address] [facility]  [place] [address]             [facility]  [address]  [facility] [address]  Phone: [phone] Dear Mrs. Cruz:    [facility] is committed to your overall health.  To help you get the most out of each of your visits, we will review your information to make sure you are up to date on all of your recommended tests and/or procedures.      Dr. Bradley Llanes has found that you may be due for tetanus, shingles, pneumonia, and flu immunizations.     Medicare does not cover all immunizations to be given in the clinic.  Check your benefits to ensure that you do not need to receive your immunizations at the pharmacy.    If you have had any of the above done at another facility, please bring the records or information with you so that your record at [facility] will be complete.  If you would like to schedule any of these, please contact me.    If you are currently taking medication, please bring it with you to your appointment for review.    Also, if you have any type of Advanced Directives, please bring them with you to your office visit so we may scan them into your chart.    If you have any questions or concerns, please don't hesitate to call.    Thank you for letting us care for you,  Lizzie Kang LPN Clinical Care Coordinator  [facility] [place]  [phone]

## 2017-10-03 ENCOUNTER — OFFICE VISIT (OUTPATIENT)
Dept: FAMILY MEDICINE | Facility: CLINIC | Age: 82
End: 2017-10-03
Payer: MEDICARE

## 2017-10-03 ENCOUNTER — LAB VISIT (OUTPATIENT)
Dept: LAB | Facility: HOSPITAL | Age: 82
End: 2017-10-03
Attending: INTERNAL MEDICINE
Payer: MEDICARE

## 2017-10-03 VITALS
HEART RATE: 74 BPM | OXYGEN SATURATION: 97 % | TEMPERATURE: 98 F | BODY MASS INDEX: 25.23 KG/M2 | SYSTOLIC BLOOD PRESSURE: 130 MMHG | HEIGHT: 62 IN | WEIGHT: 137.13 LBS | DIASTOLIC BLOOD PRESSURE: 80 MMHG

## 2017-10-03 DIAGNOSIS — F41.8 ANXIETY WITH DEPRESSION: Primary | ICD-10-CM

## 2017-10-03 DIAGNOSIS — D50.8 IRON DEFICIENCY ANEMIA SECONDARY TO INADEQUATE DIETARY IRON INTAKE: ICD-10-CM

## 2017-10-03 DIAGNOSIS — E55.9 VITAMIN D DEFICIENCY: ICD-10-CM

## 2017-10-03 DIAGNOSIS — E78.00 PURE HYPERCHOLESTEROLEMIA: ICD-10-CM

## 2017-10-03 DIAGNOSIS — M81.0 OSTEOPOROSIS, UNSPECIFIED OSTEOPOROSIS TYPE, UNSPECIFIED PATHOLOGICAL FRACTURE PRESENCE: ICD-10-CM

## 2017-10-03 DIAGNOSIS — I10 ESSENTIAL HYPERTENSION: ICD-10-CM

## 2017-10-03 DIAGNOSIS — R73.01 IMPAIRED FASTING GLUCOSE: ICD-10-CM

## 2017-10-03 DIAGNOSIS — K27.9 PUD (PEPTIC ULCER DISEASE): ICD-10-CM

## 2017-10-03 LAB
25(OH)D3+25(OH)D2 SERPL-MCNC: 45 NG/ML
ALBUMIN SERPL BCP-MCNC: 3.6 G/DL
ALP SERPL-CCNC: 97 U/L
ALT SERPL W/O P-5'-P-CCNC: 14 U/L
ANION GAP SERPL CALC-SCNC: 9 MMOL/L
AST SERPL-CCNC: 25 U/L
BASOPHILS # BLD AUTO: 0.03 K/UL
BASOPHILS NFR BLD: 0.4 %
BILIRUB SERPL-MCNC: 0.4 MG/DL
BUN SERPL-MCNC: 17 MG/DL
CALCIUM SERPL-MCNC: 9.4 MG/DL
CALCIUM SERPL-MCNC: 9.4 MG/DL
CHLORIDE SERPL-SCNC: 107 MMOL/L
CHOLEST SERPL-MCNC: 170 MG/DL
CHOLEST/HDLC SERPL: 2.7 {RATIO}
CO2 SERPL-SCNC: 28 MMOL/L
CREAT SERPL-MCNC: 0.8 MG/DL
DIFFERENTIAL METHOD: NORMAL
EOSINOPHIL # BLD AUTO: 0.2 K/UL
EOSINOPHIL NFR BLD: 2.2 %
ERYTHROCYTE [DISTWIDTH] IN BLOOD BY AUTOMATED COUNT: 13.4 %
EST. GFR  (AFRICAN AMERICAN): >60 ML/MIN/1.73 M^2
EST. GFR  (NON AFRICAN AMERICAN): >60 ML/MIN/1.73 M^2
ESTIMATED AVG GLUCOSE: 117 MG/DL
ESTIMATED AVG GLUCOSE: 117 MG/DL
FERRITIN SERPL-MCNC: 27 NG/ML
GLUCOSE SERPL-MCNC: 102 MG/DL
HBA1C MFR BLD HPLC: 5.7 %
HBA1C MFR BLD HPLC: 5.7 %
HCT VFR BLD AUTO: 41.2 %
HDLC SERPL-MCNC: 62 MG/DL
HDLC SERPL: 36.5 %
HGB BLD-MCNC: 13.4 G/DL
IRON SERPL-MCNC: 52 UG/DL
LDLC SERPL CALC-MCNC: 81 MG/DL
LYMPHOCYTES # BLD AUTO: 2.7 K/UL
LYMPHOCYTES NFR BLD: 33 %
MAGNESIUM SERPL-MCNC: 2 MG/DL
MCH RBC QN AUTO: 29 PG
MCHC RBC AUTO-ENTMCNC: 32.5 G/DL
MCV RBC AUTO: 89 FL
MONOCYTES # BLD AUTO: 0.6 K/UL
MONOCYTES NFR BLD: 7.3 %
NEUTROPHILS # BLD AUTO: 4.7 K/UL
NEUTROPHILS NFR BLD: 57 %
NONHDLC SERPL-MCNC: 108 MG/DL
PLATELET # BLD AUTO: 238 K/UL
PMV BLD AUTO: 11.4 FL
POTASSIUM SERPL-SCNC: 4.4 MMOL/L
PROT SERPL-MCNC: 7.2 G/DL
RBC # BLD AUTO: 4.62 M/UL
SATURATED IRON: 13 %
SODIUM SERPL-SCNC: 144 MMOL/L
T4 FREE SERPL-MCNC: 1.1 NG/DL
TOTAL IRON BINDING CAPACITY: 408 UG/DL
TRANSFERRIN SERPL-MCNC: 276 MG/DL
TRIGL SERPL-MCNC: 135 MG/DL
TSH SERPL DL<=0.005 MIU/L-ACNC: 0.25 UIU/ML
WBC # BLD AUTO: 8.17 K/UL

## 2017-10-03 PROCEDURE — 99999 PR PBB SHADOW E&M-EST. PATIENT-LVL III: CPT | Mod: PBBFAC,,, | Performed by: INTERNAL MEDICINE

## 2017-10-03 PROCEDURE — 84443 ASSAY THYROID STIM HORMONE: CPT

## 2017-10-03 PROCEDURE — 36415 COLL VENOUS BLD VENIPUNCTURE: CPT | Mod: PO

## 2017-10-03 PROCEDURE — 99214 OFFICE O/P EST MOD 30 MIN: CPT | Mod: S$GLB,,, | Performed by: INTERNAL MEDICINE

## 2017-10-03 PROCEDURE — 85025 COMPLETE CBC W/AUTO DIFF WBC: CPT

## 2017-10-03 PROCEDURE — 84439 ASSAY OF FREE THYROXINE: CPT

## 2017-10-03 PROCEDURE — 82306 VITAMIN D 25 HYDROXY: CPT

## 2017-10-03 PROCEDURE — 83540 ASSAY OF IRON: CPT

## 2017-10-03 PROCEDURE — 83036 HEMOGLOBIN GLYCOSYLATED A1C: CPT

## 2017-10-03 PROCEDURE — 83735 ASSAY OF MAGNESIUM: CPT

## 2017-10-03 PROCEDURE — 80053 COMPREHEN METABOLIC PANEL: CPT

## 2017-10-03 PROCEDURE — 90662 IIV NO PRSV INCREASED AG IM: CPT | Mod: S$GLB,,, | Performed by: INTERNAL MEDICINE

## 2017-10-03 PROCEDURE — 84481 FREE ASSAY (FT-3): CPT

## 2017-10-03 PROCEDURE — 82728 ASSAY OF FERRITIN: CPT

## 2017-10-03 PROCEDURE — 80061 LIPID PANEL: CPT

## 2017-10-03 PROCEDURE — G0008 ADMIN INFLUENZA VIRUS VAC: HCPCS | Mod: S$GLB,,, | Performed by: INTERNAL MEDICINE

## 2017-10-03 NOTE — PROGRESS NOTES
"Subjective:       Patient ID: Margarita Cruz is a 89 y.o. female.    Chief Complaint: Follow up labs    HPI  Here w her daughter to go over her labs. HLP: on low fat high fiber diet; on pravastatin Anxiety/depression: doing well; zoloft helping. HTN: BP avr 130-140's/70's to 80's. meds aare agreeing w her.      PUD: no heartburn; on 20 mg prilosec.    Review of Systems   Constitutional: Negative for appetite change, fever and unexpected weight change.   HENT: Negative for congestion, postnasal drip, rhinorrhea and sinus pressure.         Eyes history of seasonal ALLERGIES or perennial ALLERGIES   Eyes: Negative for discharge and itching.   Respiratory: Negative for cough, chest tightness, shortness of breath and wheezing.    Cardiovascular: Negative for chest pain, palpitations and leg swelling.   Gastrointestinal: Negative for abdominal distention, abdominal pain, blood in stool, constipation, diarrhea, nausea and vomiting.        Denies melena as well   Endocrine: Negative for polydipsia, polyphagia and polyuria.   Genitourinary: Negative for dysuria, hematuria and urgency.   Musculoskeletal: Negative for arthralgias and myalgias.   Skin: Negative for rash.   Allergic/Immunologic: Negative for environmental allergies and food allergies.        Denies seasonal or perennial ALLERGIES.   Neurological: Negative for tremors, seizures and syncope.   Hematological: Negative for adenopathy. Does not bruise/bleed easily.   Psychiatric/Behavioral:         Controlled w med for anxiety/ depression       Objective:      Vitals:    10/03/17 1350   BP: 130/80   BP Location: Left arm   Patient Position: Sitting   BP Method: Medium (Manual)   Pulse: 74   Temp: 98 °F (36.7 °C)   TempSrc: Oral   SpO2: 97%   Weight: 62.2 kg (137 lb 2 oz)   Height: 5' 2" (1.575 m)     Body mass index is 25.08 kg/m².    Physical Exam   Constitutional: She is oriented to person, place, and time. She appears well-developed and well-nourished.   HENT: "   Head: Normocephalic and atraumatic.   Eyes: EOM are normal.   Neck: Normal range of motion. Neck supple. No thyromegaly present.   Cardiovascular: Normal rate, regular rhythm and normal heart sounds.  Exam reveals no gallop.    No murmur heard.  Pulmonary/Chest: Effort normal and breath sounds normal. No respiratory distress. She has no wheezes. She has no rales.   Abdominal: Soft. Bowel sounds are normal. She exhibits no distension. There is no tenderness. There is no rebound and no guarding.   Musculoskeletal: Normal range of motion. She exhibits edema.   1+ emily LE edema. Spider veins; no calf tenderness to palp.   Lymphadenopathy:     She has no cervical adenopathy.   Neurological: She is alert and oriented to person, place, and time.   Moves all 4 extremities fine.   Skin: No rash noted.   Psychiatric: She has a normal mood and affect. Her behavior is normal. Thought content normal.   Vitals reviewed.      Assessment:       1. Anxiety with depression    2. Essential hypertension    3. Pure hypercholesterolemia    4. Iron deficiency anemia secondary to inadequate dietary iron intake    5. Impaired fasting glucose    6. Vitamin D deficiency    7. Osteoporosis, unspecified osteoporosis type, unspecified pathological fracture presence    8. PUD (peptic ulcer disease)        Plan:       Anxiety with depression; doing fine on meds; cont zoloft generic. Regular weight bearing exercises.    Essential hypertension. Maintain < 2 Gm Na a day diet, and monitor BP at home; keep a log. Diet controlled.  -     Magnesium; Future; Expected date: 10/03/2017  -     Lipid panel; Future; Expected date: 04/03/2018  -     Comprehensive metabolic panel; Future; Expected date: 04/03/2018    Pure hypercholesterolemia. Maintain low fat high fiber diet, exercise regularly. Cont pravastatin.  -     TSH; Future; Expected date: 10/03/2017  -     T4, free; Future; Expected date: 10/03/2017  -     T3, free; Future; Expected date:  10/03/2017  -     Lipid panel; Future; Expected date: 04/03/2018  -     Comprehensive metabolic panel; Future; Expected date: 04/03/2018    Iron deficiency anemia secondary to inadequate dietary iron intake; will check levels w CBC.  -     Iron and TIBC; Future; Expected date: 10/03/2017  -     Ferritin; Future; Expected date: 10/03/2017  -     CBC auto differential; Future; Expected date: 10/03/2017    Impaired fasting glucose  -     Hemoglobin A1c; Future; Expected date: 10/03/2017    Vitamin D deficiency  -     Calcium; Future; Expected date: 10/03/2017  -     VITAMIN D; Future; Expected date: 10/03/2017    Osteoporosis, unspecified osteoporosis type, unspecified pathological fracture presence. Weight bearing exercises, continue calcium and vit D supplements.        Doesn't want DEXA at this point.  -     Calcium; Future; Expected date: 10/03/2017  -     Magnesium; Future; Expected date: 10/03/2017  -     VITAMIN D; Future; Expected date: 10/03/2017    PUD: stable on low dose prilosec daily.    Other orders  -     Influenza - High Dose (65+) (PF) (IM)

## 2017-10-03 NOTE — PATIENT INSTRUCTIONS
Anxiety with depression; doing fine on meds; cont zoloft generic. Regular weight bearing exercises.    Essential hypertension. Maintain < 2 Gm Na a day diet, and monitor BP at home; keep a log. Diet controlled.  -     Magnesium; Future; Expected date: 10/03/2017  -     Lipid panel; Future; Expected date: 04/03/2018  -     Comprehensive metabolic panel; Future; Expected date: 04/03/2018    Pure hypercholesterolemia. Maintain low fat high fiber diet, exercise regularly. Cont pravastatin.  -     TSH; Future; Expected date: 10/03/2017  -     T4, free; Future; Expected date: 10/03/2017  -     T3, free; Future; Expected date: 10/03/2017  -     Lipid panel; Future; Expected date: 04/03/2018  -     Comprehensive metabolic panel; Future; Expected date: 04/03/2018    Iron deficiency anemia secondary to inadequate dietary iron intake; will check levels w CBC.  -     Iron and TIBC; Future; Expected date: 10/03/2017  -     Ferritin; Future; Expected date: 10/03/2017  -     CBC auto differential; Future; Expected date: 10/03/2017    Impaired fasting glucose  -     Hemoglobin A1c; Future; Expected date: 10/03/2017    Vitamin D deficiency  -     Calcium; Future; Expected date: 10/03/2017  -     VITAMIN D; Future; Expected date: 10/03/2017    Osteoporosis, unspecified osteoporosis type, unspecified pathological fracture presence. Weight bearing exercises, continue calcium and vit D supplements.        Doesn't want DEXA at this point.  -     Calcium; Future; Expected date: 10/03/2017  -     Magnesium; Future; Expected date: 10/03/2017  -     VITAMIN D; Future; Expected date: 10/03/2017    Other orders  -     Influenza - High Dose (65+) (PF) (IM)    Labs today at [facility]; follow up in 6 months w fasting labs prior.

## 2017-10-04 LAB — T3FREE SERPL-MCNC: 3 PG/ML

## 2017-10-10 ENCOUNTER — TELEPHONE (OUTPATIENT)
Dept: FAMILY MEDICINE | Facility: CLINIC | Age: 82
End: 2017-10-10

## 2017-10-23 DIAGNOSIS — R41.89 COGNITIVE IMPAIRMENT: ICD-10-CM

## 2017-10-23 RX ORDER — DONEPEZIL HYDROCHLORIDE 5 MG/1
5 TABLET, FILM COATED ORAL NIGHTLY
Qty: 30 TABLET | Refills: 2 | Status: SHIPPED | OUTPATIENT
Start: 2017-10-23 | End: 2018-01-18 | Stop reason: SDUPTHER

## 2017-11-18 RX ORDER — SERTRALINE HYDROCHLORIDE 50 MG/1
TABLET, FILM COATED ORAL
Qty: 30 TABLET | Refills: 3 | Status: SHIPPED | OUTPATIENT
Start: 2017-11-18 | End: 2018-03-16 | Stop reason: SDUPTHER

## 2018-01-18 DIAGNOSIS — R41.89 COGNITIVE IMPAIRMENT: ICD-10-CM

## 2018-01-18 RX ORDER — DONEPEZIL HYDROCHLORIDE 5 MG/1
5 TABLET, FILM COATED ORAL NIGHTLY
Qty: 30 TABLET | Refills: 2 | Status: SHIPPED | OUTPATIENT
Start: 2018-01-18 | End: 2018-04-13 | Stop reason: SDUPTHER

## 2018-01-25 ENCOUNTER — OFFICE VISIT (OUTPATIENT)
Dept: URGENT CARE | Facility: CLINIC | Age: 83
End: 2018-01-25
Payer: MEDICARE

## 2018-01-25 VITALS
HEIGHT: 62 IN | BODY MASS INDEX: 24.66 KG/M2 | WEIGHT: 134 LBS | SYSTOLIC BLOOD PRESSURE: 179 MMHG | OXYGEN SATURATION: 94 % | TEMPERATURE: 99 F | HEART RATE: 76 BPM | DIASTOLIC BLOOD PRESSURE: 84 MMHG

## 2018-01-25 DIAGNOSIS — J06.9 UPPER RESPIRATORY TRACT INFECTION, UNSPECIFIED TYPE: Primary | ICD-10-CM

## 2018-01-25 PROCEDURE — 99203 OFFICE O/P NEW LOW 30 MIN: CPT | Mod: 25,S$GLB,, | Performed by: EMERGENCY MEDICINE

## 2018-01-25 PROCEDURE — 96372 THER/PROPH/DIAG INJ SC/IM: CPT | Mod: S$GLB,,, | Performed by: EMERGENCY MEDICINE

## 2018-01-25 RX ORDER — BETAMETHASONE SODIUM PHOSPHATE AND BETAMETHASONE ACETATE 3; 3 MG/ML; MG/ML
9 INJECTION, SUSPENSION INTRA-ARTICULAR; INTRALESIONAL; INTRAMUSCULAR; SOFT TISSUE
Status: COMPLETED | OUTPATIENT
Start: 2018-01-25 | End: 2018-01-25

## 2018-01-25 RX ORDER — AZITHROMYCIN 500 MG/1
500 TABLET, FILM COATED ORAL DAILY
Qty: 5 TABLET | Refills: 0 | Status: SHIPPED | OUTPATIENT
Start: 2018-01-25 | End: 2018-01-30

## 2018-01-25 RX ADMIN — BETAMETHASONE SODIUM PHOSPHATE AND BETAMETHASONE ACETATE 9 MG: 3; 3 INJECTION, SUSPENSION INTRA-ARTICULAR; INTRALESIONAL; INTRAMUSCULAR; SOFT TISSUE at 02:01

## 2018-01-25 NOTE — PROGRESS NOTES
"Subjective:       Patient ID: Margarita Cruz is a 89 y.o. female.    Vitals:  height is 5' 2" (1.575 m) and weight is 60.8 kg (134 lb). Her oral temperature is 99.3 °F (37.4 °C). Her blood pressure is 179/84 (abnormal) and her pulse is 76. Her oxygen saturation is 94% (abnormal).     Chief Complaint: Nasal Congestion ( chest congestion)    URI    This is a new problem. The current episode started 1 to 4 weeks ago. The problem has been waxing and waning. There has been no fever. Associated symptoms include congestion, coughing (and "stuffy head"), rhinorrhea, sinus pain and sneezing. Pertinent negatives include no abdominal pain, chest pain, ear pain, headaches, nausea, sore throat, vomiting or wheezing.     Review of Systems   Constitution: Negative for chills, fever and malaise/fatigue.   HENT: Positive for congestion, rhinorrhea, sinus pain and sneezing. Negative for ear pain, hoarse voice and sore throat.    Eyes: Negative for discharge and redness.   Cardiovascular: Negative for chest pain, dyspnea on exertion and leg swelling.   Respiratory: Positive for cough (and "stuffy head"). Negative for shortness of breath, sputum production and wheezing.    Musculoskeletal: Negative for myalgias.   Gastrointestinal: Negative for abdominal pain, nausea and vomiting.   Neurological: Negative for headaches.       Objective:      Physical Exam   Constitutional: She is oriented to person, place, and time. She appears well-developed and well-nourished. She is cooperative.  Non-toxic appearance. She does not appear ill. No distress.   HENT:   Head: Normocephalic and atraumatic.   Right Ear: Hearing, tympanic membrane, external ear and ear canal normal.   Left Ear: Hearing, tympanic membrane, external ear and ear canal normal.   Nose: Mucosal edema present. No rhinorrhea or nasal deformity. No epistaxis. Right sinus exhibits no maxillary sinus tenderness and no frontal sinus tenderness. Left sinus exhibits no maxillary sinus " tenderness and no frontal sinus tenderness.   Mouth/Throat: Uvula is midline, oropharynx is clear and moist and mucous membranes are normal. No trismus in the jaw. Normal dentition. No uvula swelling. No posterior oropharyngeal erythema.   Eyes: Conjunctivae and lids are normal. Right eye exhibits no discharge. Left eye exhibits no discharge. No scleral icterus.   Sclera clear bilat   Neck: Trachea normal, normal range of motion, full passive range of motion without pain and phonation normal. Neck supple.   Cardiovascular: Normal rate, regular rhythm, normal heart sounds, intact distal pulses and normal pulses.    Pulmonary/Chest: Effort normal and breath sounds normal. No respiratory distress.   Abdominal: Soft. Normal appearance. She exhibits no pulsatile midline mass.   Musculoskeletal: Normal range of motion. She exhibits no edema or deformity.   Neurological: She is alert and oriented to person, place, and time. She exhibits normal muscle tone. Coordination normal.   Skin: Skin is warm, dry and intact. She is not diaphoretic. No pallor.   Psychiatric: She has a normal mood and affect. Her speech is normal and behavior is normal. Judgment and thought content normal. Cognition and memory are normal.   Nursing note and vitals reviewed.      Assessment:       1. Upper respiratory tract infection, unspecified type        Plan:         Upper respiratory tract infection, unspecified type  -     betamethasone acetate-betamethasone sodium phosphate injection 9 mg; Inject 1.5 mLs (9 mg total) into the muscle one time.  -     azithromycin (ZITHROMAX) 500 MG tablet; Take 1 tablet (500 mg total) by mouth once daily. Take 1 tablet daily for 5 days.  Dispense: 5 tablet; Refill: 0

## 2018-01-25 NOTE — PATIENT INSTRUCTIONS
Understanding Sinus Problems    You dont often think about your sinuses until theres a problem. One day you realize you cant smell dinner cooking. Or you find you often have headaches or problems breathing through your nose.  Symptoms of sinus problems  Sinus problems can cause uncomfortable symptoms. Your nose may run constantly. You might have trouble sleeping at night. You may even lose your sense of smell. Other symptoms can include:  · Nasal congestion  · Fullness in ears  · Green, yellow, or bloody drainage from the nose  · Trouble tasting food  · Frequent headaches  · Facial pain  · Cough  When sinuses are blocked  If something blocks the passages in the nose or sinuses, mucus cant drain. Mucus-filled sinuses often become infected.  · Colds cause the lining of the nose and sinuses to swell and make extra mucus. A buildup of mucus can lead to a more serious infection.  · Allergies irritate turbinates and other tissues. This causes swelling, which can cause a blockage. Over time, this irritation can also lead to sacs of swollen tissue (polyps).  · Polyps may form in both the sinuses and nose. Polyps can grow large enough to clog nasal passages and block drainage.  · A crooked (deviated) septum may block nasal passages. This is often the result of an injury.  Date Last Reviewed: 11/1/2016  © 6371-9730 [employer]. [address] [place] [address], [place] [address]. All rights reserved. This information is not intended as a substitute for professional medical care. Always follow your healthcare professional's instructions.

## 2018-03-01 ENCOUNTER — OFFICE VISIT (OUTPATIENT)
Dept: FAMILY MEDICINE | Facility: CLINIC | Age: 83
End: 2018-03-01
Payer: MEDICARE

## 2018-03-01 VITALS
OXYGEN SATURATION: 97 % | HEIGHT: 62 IN | BODY MASS INDEX: 24.34 KG/M2 | HEART RATE: 79 BPM | SYSTOLIC BLOOD PRESSURE: 134 MMHG | TEMPERATURE: 99 F | WEIGHT: 132.25 LBS | DIASTOLIC BLOOD PRESSURE: 70 MMHG

## 2018-03-01 DIAGNOSIS — E78.00 PURE HYPERCHOLESTEROLEMIA: ICD-10-CM

## 2018-03-01 DIAGNOSIS — F41.8 ANXIETY WITH DEPRESSION: ICD-10-CM

## 2018-03-01 DIAGNOSIS — R53.1 SPELL OF GENERALIZED WEAKNESS: ICD-10-CM

## 2018-03-01 DIAGNOSIS — I10 UNCONTROLLED HYPERTENSION: ICD-10-CM

## 2018-03-01 DIAGNOSIS — R41.89 COGNITIVE IMPAIRMENT: ICD-10-CM

## 2018-03-01 DIAGNOSIS — Z09 HOSPITAL DISCHARGE FOLLOW-UP: Primary | ICD-10-CM

## 2018-03-01 PROCEDURE — 99999 PR PBB SHADOW E&M-EST. PATIENT-LVL III: CPT | Mod: PBBFAC,,, | Performed by: INTERNAL MEDICINE

## 2018-03-01 PROCEDURE — 99214 OFFICE O/P EST MOD 30 MIN: CPT | Mod: S$GLB,,, | Performed by: INTERNAL MEDICINE

## 2018-03-01 RX ORDER — AMLODIPINE BESYLATE 5 MG/1
5 TABLET ORAL DAILY
COMMUNITY
Start: 2018-02-19 | End: 2018-03-08 | Stop reason: SDUPTHER

## 2018-03-01 RX ORDER — LISINOPRIL 2.5 MG/1
2.5 TABLET ORAL DAILY
Qty: 30 TABLET | Refills: 3 | Status: SHIPPED | OUTPATIENT
Start: 2018-03-01 | End: 2018-04-24 | Stop reason: SDUPTHER

## 2018-03-01 RX ORDER — HYDRALAZINE HYDROCHLORIDE 25 MG/1
25 TABLET, FILM COATED ORAL 2 TIMES DAILY
COMMUNITY
Start: 2018-02-19 | End: 2018-03-08 | Stop reason: SDUPTHER

## 2018-03-01 NOTE — PROGRESS NOTES
Subjective:       Patient ID: Margarita Cruz is a 89 y.o. female.    Chief Complaint: Hospital Follow Up (pt was at ; pt's daughter when call told staff when making apt that she was follwing up from  hospital however they had her for 6 month follow up - records pending from )    HPI  Here for f/u from [facility]/ER and admit w d/ch 2/19/18. Records trying to be obtained but not here yet.  Have lab results though available. Apparently patient was out after dinner was going to car and developed a sudden weakness with blood pressure 200/98 in the truck and brought to [facility] ER for further evaluation; with blood pressure they're 180-190/70 to 80s with pulse in the 90s.  During hospital stay she was given IV labetalol and hydralazine 25 mg twice a day and Norvasc 5 mg daily was added to her regimen.  A neuro consult was placed to Dr. Varela.  She was monitored on telemetry and cardiology consult was placed as well w no recommendations apparently being made from the cardiologist.  At discharge her blood pressure was in the 140s / 60s to 70s; 134/70 here; she was advised to monitor blood pressure at home; her white count was minimally elevated in the hospital 11.4; CPK and troponin I were negative as well as NT proBNP.  Her anxiety and depression have been stable and have been controlled with sertraline 50 mg daily.  Cognitive impairment has been stable she is on donepezil.  Medications were reviewed and addressed.    Now no c/o CP, SOB, or palpitations. BP better controlled at 140's-150's/60's-70's.  Total time 5:10 PM to 5:40 PM.  Greater than 50% of time spent in discussion, counseling, and review.  Case was also discussed at length with the patient and her daughter who is an RN. William renal art US ordered to r/o EMANUEL for f/u; labs also ordered for f/u.    Review of Systems   Constitutional: Negative for appetite change and fever.   HENT: Negative for congestion, postnasal drip, rhinorrhea and  "sinus pressure.    Eyes: Negative for discharge and itching.   Respiratory: Negative for cough, chest tightness, shortness of breath and wheezing.    Cardiovascular: Negative for chest pain, palpitations and leg swelling.   Gastrointestinal: Negative for abdominal distention, abdominal pain, blood in stool, constipation, diarrhea, nausea and vomiting.   Endocrine: Negative for polydipsia, polyphagia and polyuria.   Genitourinary: Negative for dysuria and hematuria.   Musculoskeletal: Negative for arthralgias and myalgias.   Skin: Negative for rash.   Allergic/Immunologic: Negative for environmental allergies and food allergies.   Neurological: Negative for tremors, seizures and syncope.   Hematological: Negative for adenopathy. Does not bruise/bleed easily.   Psychiatric/Behavioral:        Anxiety w depression on sertraline; controlled.       Objective:      Vitals:    03/01/18 1523   BP: 134/70   BP Location: Left arm   Patient Position: Sitting   BP Method: Medium (Manual)   Pulse: 79   Temp: 98.6 °F (37 °C)   TempSrc: Oral   SpO2: 97%   Weight: 60 kg (132 lb 4.4 oz)   Height: 5' 2" (1.575 m)     Body mass index is 24.19 kg/m².    Physical Exam   Constitutional: She is oriented to person, place, and time. She appears well-developed and well-nourished.   HENT:   Head: Normocephalic and atraumatic.   Eyes: EOM are normal.   Neck: Normal range of motion. Neck supple. No thyromegaly present.   No carotid bruits heard.   Cardiovascular: Normal rate, regular rhythm and normal heart sounds.  Exam reveals no gallop.    No murmur heard.  Pulmonary/Chest: Effort normal and breath sounds normal. No respiratory distress. She has no wheezes. She has no rales.   Abdominal: Soft. Bowel sounds are normal. She exhibits no distension. There is no tenderness. There is no rebound and no guarding.   Musculoskeletal: Normal range of motion. She exhibits no edema.   Lymphadenopathy:     She has no cervical adenopathy.   Neurological: " She is alert and oriented to person, place, and time.   Moves all 4 extremities fine.   Skin: No rash noted.   Psychiatric: She has a normal mood and affect. Her behavior is normal. Thought content normal.   Vitals reviewed.      Assessment:       1. Hospital discharge follow-up    2. Spell of generalized weakness    3. Uncontrolled hypertension    4. Anxiety with depression    5. Cognitive impairment    6. Pure hypercholesterolemia        Plan:       Hospital discharge follow-up; BP better controlled since recent hospitalization; however could use additional improvement; monitor BP at home; keep a log. Watch her salt intake.    Uncontrolled hypertension. Maintain < 2 Gm Na a day diet, and monitor BP at home; keep a log. Add lisinopril 2.5 mg a day; cont amlodipine, hydralazine, and  lisinopril.  William renal art US to r/o EMANUEL; aldosterone/renin activity. TFT's. Add ASA 81 mg a day. Call after next several days w BP readings.        lisinopril (PRINIVIL,ZESTRIL) 2.5 MG tablet; Take 1 tablet (2.5 mg total) by mouth once daily.  Dispense: 30 tablet; Refill: 3  -     Basic metabolic panel; Future; Expected date: 03/01/2018  -     US Renal Artery Stenosis Hyperten (xpd); Future; Expected date: 03/01/2018  -     TSH; Future; Expected date: 03/01/2018  -     T4, free; Future; Expected date: 03/01/2018  -     T3, free; Future; Expected date: 03/01/2018  -     Aldosterone/Renin Activity Ratio; Future; Expected date: 03/01/2018    Anxiety with depression; on sertraline; helping.  -     TSH; Future; Expected date: 03/01/2018  -     T4, free; Future; Expected date: 03/01/2018  -     T3, free; Future; Expected date: 03/01/2018    Spell of generalized weakness prior to hospitalization; has cleared.    Cognitive impairment; continue donepezil supplements    Hypercholesterolemia; continue low-fat high-fiber diet and pravastatin

## 2018-03-01 NOTE — PATIENT INSTRUCTIONS
Hospital discharge follow-up; BP better controlled since recent hospitalization; monitor BP at home; keep a log. Watch her salt intaer.    Uncontrolled hypertension. Maintain < 2 Gm Na a day diet, and monitor BP at home; keep a log. Add lisinopril 2.5 mg a day; cont present other meds.  William renal art US to r/o EMANUEL; aldosterone/renin activity. TFT's. Add ASA 81 mg a day. Call after next several days w BP readings.        lisinopril (PRINIVIL,ZESTRIL) 2.5 MG tablet; Take 1 tablet (2.5 mg total) by mouth once daily.  Dispense: 30 tablet; Refill: 3  -     Basic metabolic panel; Future; Expected date: 03/01/2018  -     US Renal Artery Stenosis Hyperten (xpd); Future; Expected date: 03/01/2018  -     TSH; Future; Expected date: 03/01/2018  -     T4, free; Future; Expected date: 03/01/2018  -     T3, free; Future; Expected date: 03/01/2018  -     Aldosterone/Renin Activity Ratio; Future; Expected date: 03/01/2018    Anxiety with depression; on sertraline; helping.  -     TSH; Future; Expected date: 03/01/2018  -     T4, free; Future; Expected date: 03/01/2018  -     T3, free; Future; Expected date: 03/01/2018    Spell of generalized weakness; has cleared.

## 2018-03-08 NOTE — TELEPHONE ENCOUNTER
----- Message from Neli Franco sent at 3/8/2018  9:24 AM CST -----  Contact: Cristin Valdes (Daughter) [phone]  Cristin Valdes (Daughter) [phone] please call in regards to orders

## 2018-03-09 RX ORDER — AMLODIPINE BESYLATE 5 MG/1
5 TABLET ORAL DAILY
Qty: 30 TABLET | Refills: 2 | Status: SHIPPED | OUTPATIENT
Start: 2018-03-09 | End: 2018-06-08 | Stop reason: SDUPTHER

## 2018-03-09 RX ORDER — HYDRALAZINE HYDROCHLORIDE 25 MG/1
25 TABLET, FILM COATED ORAL 2 TIMES DAILY
Qty: 30 TABLET | Refills: 2 | Status: SHIPPED | OUTPATIENT
Start: 2018-03-09 | End: 2018-05-11 | Stop reason: SDUPTHER

## 2018-03-13 ENCOUNTER — TELEPHONE (OUTPATIENT)
Dept: FAMILY MEDICINE | Facility: CLINIC | Age: 83
End: 2018-03-13

## 2018-03-13 NOTE — TELEPHONE ENCOUNTER
----- Message from Drea Carter sent at 3/13/2018  1:53 PM CDT -----  Contact: yolanda Jess   Patient's daughter Jess called stating she spoke to Юлия last week   Some prescriptions were suppose to be call to the pharmacy last week but nothing there   Please call her  to advise.    Thanks

## 2018-03-15 ENCOUNTER — PATIENT OUTREACH (OUTPATIENT)
Dept: ADMINISTRATIVE | Facility: HOSPITAL | Age: 83
End: 2018-03-15

## 2018-03-15 NOTE — LETTER
March 15, 2018    Margarita Cruz  [address]  [place] [address]             [facility]  [address]  [place] [address]  Phone: [phone] Dear Mrs. Cruz:    [facility] is committed to your overall health.  To help you get the most out of each of your visits, we will review your information to make sure you are up to date on all of your recommended tests and/or procedures.      Dr. Bradley Llanes has found that your chart shows you may be due for tetanus, shingles, and pneumonia immunizations.     Medicare does not cover all immunizations to be given in the clinic.  Check your benefits to ensure that you do not need to receive your immunizations at the pharmacy.    If you have had any of the above done at another facility, please bring the records or information with you so that your record at [facility] will be complete.  If you would like to schedule any of these, please contact me.    If you are currently taking medication, please bring it with you to your appointment for review.    Also, if you have any type of Advanced Directives, please bring them with you to your office visit so we may scan them into your chart.    If you have any questions or concerns, please don't hesitate to call.    Thank you for letting us care for you,  Lizzie Kang LPN Clinical Care Coordinator  [facility] [place]  [phone]

## 2018-03-16 RX ORDER — SERTRALINE HYDROCHLORIDE 50 MG/1
TABLET, FILM COATED ORAL
Qty: 30 TABLET | Refills: 3 | Status: SHIPPED | OUTPATIENT
Start: 2018-03-16 | End: 2018-07-05 | Stop reason: SDUPTHER

## 2018-03-19 ENCOUNTER — TELEPHONE (OUTPATIENT)
Dept: FAMILY MEDICINE | Facility: CLINIC | Age: 83
End: 2018-03-19

## 2018-03-19 NOTE — TELEPHONE ENCOUNTER
----- Message from Anna Crooks sent at 3/19/2018  8:57 AM CDT -----  Contact: Daughter Cristin Valdes  She said this is the 3rd request for medications.  Rx hydrALAZINE (APRESOLINE) 25 MG tablet  Rx amLODIPine (NORVASC) 5 MG tablet  I advised it was sent on 03/09, but she states she went there yesterday and they did not have it.     [facility]/pharmacy #[id number] - [place] - [address]  [address]  [place] [address]  Phone: [phone] Fax: [phone]

## 2018-03-19 NOTE — TELEPHONE ENCOUNTER
Spoke with Casi at [facility] pharmacy and she said that medications have been ready.     Spoke with Cristin and informed her that I spoke with Casi and that she said medications have been ready.   Cristin said that she will go  medication today.

## 2018-03-20 ENCOUNTER — HOSPITAL ENCOUNTER (OUTPATIENT)
Dept: RADIOLOGY | Facility: HOSPITAL | Age: 83
Discharge: HOME | End: 2018-03-20
Attending: INTERNAL MEDICINE
Payer: MEDICARE

## 2018-03-20 DIAGNOSIS — I10 UNCONTROLLED HYPERTENSION: ICD-10-CM

## 2018-03-20 PROCEDURE — 76770 US EXAM ABDO BACK WALL COMP: CPT | Mod: 26,59,, | Performed by: RADIOLOGY

## 2018-03-20 PROCEDURE — 76770 US EXAM ABDO BACK WALL COMP: CPT | Mod: TC,PO

## 2018-03-20 PROCEDURE — 93975 VASCULAR STUDY: CPT | Mod: 26,,, | Performed by: RADIOLOGY

## 2018-03-22 ENCOUNTER — TELEPHONE (OUTPATIENT)
Dept: FAMILY MEDICINE | Facility: CLINIC | Age: 83
End: 2018-03-22

## 2018-03-22 NOTE — TELEPHONE ENCOUNTER
Spoke with daughter, Cristin Valdes. notified her of Dr Llanes's message and read note to her. Daughter understands and will keep appointment.

## 2018-03-22 NOTE — TELEPHONE ENCOUNTER
----- Message from Bradley Llanes MD sent at 3/22/2018  8:44 AM CDT -----  Please notify patient's daughter that bilateral renal artery ultrasound showed no hemodynamically significant renal artery stenosis evidence.  Please continue present management keep her follow-up appointment

## 2018-03-29 ENCOUNTER — OFFICE VISIT (OUTPATIENT)
Dept: FAMILY MEDICINE | Facility: CLINIC | Age: 83
End: 2018-03-29
Payer: MEDICARE

## 2018-03-29 VITALS
HEART RATE: 70 BPM | SYSTOLIC BLOOD PRESSURE: 132 MMHG | OXYGEN SATURATION: 97 % | WEIGHT: 133.94 LBS | HEIGHT: 62 IN | BODY MASS INDEX: 24.65 KG/M2 | DIASTOLIC BLOOD PRESSURE: 80 MMHG | TEMPERATURE: 98 F

## 2018-03-29 DIAGNOSIS — I10 ESSENTIAL HYPERTENSION: Primary | ICD-10-CM

## 2018-03-29 DIAGNOSIS — F41.8 ANXIETY WITH DEPRESSION: ICD-10-CM

## 2018-03-29 DIAGNOSIS — R73.01 IMPAIRED FASTING GLUCOSE: ICD-10-CM

## 2018-03-29 DIAGNOSIS — E55.9 VITAMIN D DEFICIENCY: ICD-10-CM

## 2018-03-29 DIAGNOSIS — F03.90 SENILE DEMENTIA WITHOUT BEHAVIORAL DISTURBANCE: ICD-10-CM

## 2018-03-29 DIAGNOSIS — E78.00 PURE HYPERCHOLESTEROLEMIA: ICD-10-CM

## 2018-03-29 PROCEDURE — 99214 OFFICE O/P EST MOD 30 MIN: CPT | Mod: S$GLB,,, | Performed by: INTERNAL MEDICINE

## 2018-03-29 PROCEDURE — 99999 PR PBB SHADOW E&M-EST. PATIENT-LVL III: CPT | Mod: PBBFAC,,, | Performed by: INTERNAL MEDICINE

## 2018-03-29 RX ORDER — MEMANTINE HYDROCHLORIDE 5 MG/1
5 TABLET ORAL DAILY
Qty: 30 TABLET | Refills: 3 | Status: SHIPPED | OUTPATIENT
Start: 2018-03-29 | End: 2018-08-09 | Stop reason: SDUPTHER

## 2018-03-29 NOTE — PATIENT INSTRUCTIONS
Essential hypertension. Maintain < 2 Gm Na a day diet, and monitor BP at home; keep a log. Cont her BP meds.  -     Comprehensive metabolic panel; Future; Expected date: 03/29/2018  -     Urinalysis; Future; Expected date: 09/29/2018  -     Urinalysis Microscopic; Future; Expected date: 09/29/2018  -     Magnesium; Future; Expected date: 03/29/2018    Pure hypercholesterolemia. Maintain low fat high fiber diet, exercise regularly. Cont pravastatin  -     Comprehensive metabolic panel; Future; Expected date: 03/29/2018  -     Lipid panel; Future; Expected date: 03/29/2018    Impaired fasting glucose. Exercise recommended with weight reduction and low carb diet; we'll follow hemoglobin A1c's with you periodically.  -     Comprehensive metabolic panel; Future; Expected date: 03/29/2018    Vitamin D deficiency; increase to 3000 iu a day of vit D3.  -     Vitamin D; Future; Expected date: 03/29/2018    Anxiety with depression; has been stable w present meds.    Senile dementia without behavioral disturbance; on aricept 5 mg a day; neuro Dr Nichole branham w trying namenda.  -     Vitamin D; Future; Expected date: 03/29/2018  -     memantine (NAMENDA) 5 MG Tab; Take 1 tablet (5 mg total) by mouth once daily.  Dispense: 30 tablet; Refill: 3

## 2018-03-29 NOTE — PROGRESS NOTES
Subjective:       Patient ID: Margarita Cruz is a 89 y.o. female.    Chief Complaint: Labs Only    HPI  Here to go over her labs.  Overall patient is doing fine; she has anxiety with depression which is been stable and controlled with sertraline 50 mg by mouth daily; she has senile dementia without behavioral disturbance she is on Aricept 5 mg daily.  Seeing Dr. Varela neurology; he's given the okay to go ahead and add Namenda if needed; daughter doesn't notice much improvement if any with the Aricept, so will go ahead and add Namenda 5 mg daily by mouth.  Based on a vitamin D levels we'll increase 1000 IUs per day to 3000 IUs total per day of Vit D3..  Hypertension; blood pressure runs 130s to 140s over 60s to 80s with pulse in the 70s.  This is at home.  Here manually she is 132/80 and controlled.  Impaired fasting glucose; she does watch her carbs.  Hemoglobin A1c is 5.6.  Case was  discussed at length with patient as well as her daughter, who is an RN, at time of evaluation; total time 3:50 PM to 440 PM; greater than 50% of time spent on discussion, counseling, and review.    Review of Systems   Constitutional: Negative for appetite change, fever and unexpected weight change.   HENT: Negative for congestion, postnasal drip, rhinorrhea and sinus pressure.    Eyes: Negative for discharge and itching.   Respiratory: Negative for cough, chest tightness, shortness of breath and wheezing.    Cardiovascular: Negative for chest pain, palpitations and leg swelling.   Gastrointestinal: Negative for abdominal distention, abdominal pain, blood in stool, constipation, diarrhea, nausea and vomiting.   Endocrine: Negative for polydipsia, polyphagia and polyuria.   Genitourinary: Negative for dysuria and hematuria.   Musculoskeletal: Negative for arthralgias and myalgias.   Skin: Negative for rash.   Allergic/Immunologic: Negative for environmental allergies and food allergies.   Neurological: Negative for tremors, seizures and  "syncope.   Hematological: Negative for adenopathy. Does not bruise/bleed easily.   Psychiatric/Behavioral:        Anxiety and depression doing better.       Objective:      Vitals:    03/29/18 1503   BP: 132/80   BP Location: Right arm   Patient Position: Sitting   BP Method: Medium (Manual)   Pulse: 70   Temp: 97.7 °F (36.5 °C)   TempSrc: Oral   SpO2: 97%   Weight: 60.7 kg (133 lb 14.9 oz)   Height: 5' 2" (1.575 m)     Body mass index is 24.5 kg/m².    Physical Exam   Constitutional: She is oriented to person, place, and time. She appears well-developed and well-nourished.   HENT:   Head: Normocephalic and atraumatic.   Eyes: EOM are normal.   Neck: Normal range of motion. Neck supple. No thyromegaly present.   Cardiovascular: Normal rate, regular rhythm and normal heart sounds.  Exam reveals no gallop.    No murmur heard.  Pulmonary/Chest: Effort normal and breath sounds normal. No respiratory distress. She has no wheezes. She has no rales.   Abdominal: Soft. Bowel sounds are normal. She exhibits no distension. There is no tenderness. There is no rebound and no guarding.   Musculoskeletal: Normal range of motion. She exhibits no edema.   Lymphadenopathy:     She has no cervical adenopathy.   Neurological: She is alert and oriented to person, place, and time.   Moves all 4 extremities fine.   Skin: No rash noted.   Psychiatric: She has a normal mood and affect. Her behavior is normal. Thought content normal.   Vitals reviewed.      Assessment:       1. Essential hypertension    2. Pure hypercholesterolemia    3. Impaired fasting glucose    4. Vitamin D deficiency    5. Anxiety with depression    6. Senile dementia without behavioral disturbance        Plan:       Essential hypertension. Maintain < 2 Gm Na a day diet, and monitor BP at home; keep a log. Cont her BP meds.  On amlodipine and hydralazine and lisinopril  -     Comprehensive metabolic panel; Future; Expected date: 03/29/2018  -     Urinalysis; Future; " Expected date: 09/29/2018  -     Urinalysis Microscopic; Future; Expected date: 09/29/2018  -     Magnesium; Future; Expected date: 03/29/2018    Pure hypercholesterolemia. Maintain low fat high fiber diet, exercise regularly. Cont pravastatin  -     Comprehensive metabolic panel; Future; Expected date: 03/29/2018  -     Lipid panel; Future; Expected date: 03/29/2018    Impaired fasting glucose. Exercise recommended with weight reduction and low carb diet; we'll follow hemoglobin A1c's with you periodically.  -     Comprehensive metabolic panel; Future; Expected date: 03/29/2018    Vitamin D deficiency; increase to 3000 iu a day of vit D3.  -     Vitamin D; Future; Expected date: 03/29/2018    Anxiety with depression; has been stable w present meds.  On sertraline 50 mg by mouth daily    Senile dementia without behavioral disturbance; on aricept 5 mg a day; neuro Dr Nichole velazquez trying namenda.  -     Vitamin D; Future; Expected date: 03/29/2018  -     memantine (NAMENDA) 5 MG Tab; Take 1 tablet (5 mg total) by mouth once daily.  Dispense: 30 tablet; Refill: 3

## 2018-04-13 DIAGNOSIS — R41.89 COGNITIVE IMPAIRMENT: ICD-10-CM

## 2018-04-13 RX ORDER — DONEPEZIL HYDROCHLORIDE 5 MG/1
5 TABLET, FILM COATED ORAL NIGHTLY
Qty: 30 TABLET | Refills: 3 | Status: SHIPPED | OUTPATIENT
Start: 2018-04-13 | End: 2018-08-04 | Stop reason: SDUPTHER

## 2018-04-24 DIAGNOSIS — I10 UNCONTROLLED HYPERTENSION: ICD-10-CM

## 2018-04-24 RX ORDER — PRAVASTATIN SODIUM 40 MG/1
40 TABLET ORAL NIGHTLY
Qty: 90 TABLET | Refills: 1 | Status: SHIPPED | OUTPATIENT
Start: 2018-04-24 | End: 2019-01-31 | Stop reason: SDUPTHER

## 2018-04-24 RX ORDER — LISINOPRIL 2.5 MG/1
2.5 TABLET ORAL DAILY
Qty: 90 TABLET | Refills: 1 | Status: SHIPPED | OUTPATIENT
Start: 2018-04-24 | End: 2019-02-26

## 2018-05-12 RX ORDER — HYDRALAZINE HYDROCHLORIDE 25 MG/1
25 TABLET, FILM COATED ORAL 2 TIMES DAILY
Qty: 30 TABLET | Refills: 2 | Status: SHIPPED | OUTPATIENT
Start: 2018-05-12 | End: 2018-07-10 | Stop reason: SDUPTHER

## 2018-06-08 RX ORDER — AMLODIPINE BESYLATE 5 MG/1
5 TABLET ORAL DAILY
Qty: 30 TABLET | Refills: 2 | Status: SHIPPED | OUTPATIENT
Start: 2018-06-08 | End: 2018-08-10 | Stop reason: SDUPTHER

## 2018-07-05 RX ORDER — SERTRALINE HYDROCHLORIDE 50 MG/1
TABLET, FILM COATED ORAL
Qty: 30 TABLET | Refills: 3 | Status: SHIPPED | OUTPATIENT
Start: 2018-07-05 | End: 2019-02-21 | Stop reason: SDUPTHER

## 2018-07-10 RX ORDER — HYDRALAZINE HYDROCHLORIDE 25 MG/1
25 TABLET, FILM COATED ORAL 2 TIMES DAILY
Qty: 30 TABLET | Refills: 2 | Status: SHIPPED | OUTPATIENT
Start: 2018-07-10 | End: 2018-10-28 | Stop reason: SDUPTHER

## 2018-07-25 DIAGNOSIS — F03.90 SENILE DEMENTIA WITHOUT BEHAVIORAL DISTURBANCE: ICD-10-CM

## 2018-07-25 RX ORDER — MEMANTINE HYDROCHLORIDE 5 MG/1
5 TABLET ORAL DAILY
Qty: 30 TABLET | Refills: 3 | OUTPATIENT
Start: 2018-07-25

## 2018-08-04 DIAGNOSIS — R41.89 COGNITIVE IMPAIRMENT: ICD-10-CM

## 2018-08-06 RX ORDER — DONEPEZIL HYDROCHLORIDE 5 MG/1
5 TABLET, FILM COATED ORAL NIGHTLY
Qty: 30 TABLET | Refills: 3 | Status: SHIPPED | OUTPATIENT
Start: 2018-08-06 | End: 2018-12-14 | Stop reason: SDUPTHER

## 2018-08-09 DIAGNOSIS — F03.90 SENILE DEMENTIA WITHOUT BEHAVIORAL DISTURBANCE: ICD-10-CM

## 2018-08-10 RX ORDER — AMLODIPINE BESYLATE 5 MG/1
5 TABLET ORAL DAILY
Qty: 90 TABLET | Refills: 1 | Status: SHIPPED | OUTPATIENT
Start: 2018-08-10 | End: 2019-03-09 | Stop reason: SDUPTHER

## 2018-08-10 RX ORDER — MEMANTINE HYDROCHLORIDE 5 MG/1
5 TABLET ORAL DAILY
Qty: 30 TABLET | Refills: 2 | Status: SHIPPED | OUTPATIENT
Start: 2018-08-10 | End: 2018-11-04 | Stop reason: SDUPTHER

## 2018-10-31 RX ORDER — HYDRALAZINE HYDROCHLORIDE 25 MG/1
25 TABLET, FILM COATED ORAL 2 TIMES DAILY
Qty: 180 TABLET | Refills: 1 | Status: SHIPPED | OUTPATIENT
Start: 2018-10-31 | End: 2019-05-02 | Stop reason: SDUPTHER

## 2018-11-04 DIAGNOSIS — F03.90 SENILE DEMENTIA WITHOUT BEHAVIORAL DISTURBANCE: ICD-10-CM

## 2018-11-05 RX ORDER — MEMANTINE HYDROCHLORIDE 5 MG/1
TABLET ORAL
Qty: 30 TABLET | Refills: 3 | Status: SHIPPED | OUTPATIENT
Start: 2018-11-05 | End: 2019-03-03 | Stop reason: SDUPTHER

## 2018-12-14 DIAGNOSIS — R41.89 COGNITIVE IMPAIRMENT: ICD-10-CM

## 2018-12-14 RX ORDER — DONEPEZIL HYDROCHLORIDE 5 MG/1
5 TABLET, FILM COATED ORAL NIGHTLY
Qty: 90 TABLET | Refills: 1 | Status: SHIPPED | OUTPATIENT
Start: 2018-12-14 | End: 2019-06-06 | Stop reason: SDUPTHER

## 2019-02-01 RX ORDER — PRAVASTATIN SODIUM 40 MG/1
40 TABLET ORAL NIGHTLY
Qty: 90 TABLET | Refills: 0 | Status: SHIPPED | OUTPATIENT
Start: 2019-02-01 | End: 2019-10-24

## 2019-02-01 NOTE — TELEPHONE ENCOUNTER
"Called and spoke with pt's daughter ([employer]) Pt's daughter stated "I do all the scheduling because she suffers from short term memory loss." Pt was scheduled 2/26/2019 for follow-up and labs were scheduled the week prior for 2/19/2019.   "

## 2019-02-19 ENCOUNTER — LAB VISIT (OUTPATIENT)
Dept: LAB | Facility: HOSPITAL | Age: 84
End: 2019-02-19
Attending: INTERNAL MEDICINE
Payer: MEDICARE

## 2019-02-19 DIAGNOSIS — E55.9 VITAMIN D DEFICIENCY: ICD-10-CM

## 2019-02-19 DIAGNOSIS — F03.90 SENILE DEMENTIA WITHOUT BEHAVIORAL DISTURBANCE: ICD-10-CM

## 2019-02-19 DIAGNOSIS — I10 ESSENTIAL HYPERTENSION: ICD-10-CM

## 2019-02-19 DIAGNOSIS — E78.00 PURE HYPERCHOLESTEROLEMIA: ICD-10-CM

## 2019-02-19 DIAGNOSIS — R73.01 IMPAIRED FASTING GLUCOSE: ICD-10-CM

## 2019-02-19 LAB
25(OH)D3+25(OH)D2 SERPL-MCNC: 46 NG/ML
ALBUMIN SERPL BCP-MCNC: 4 G/DL
ALP SERPL-CCNC: 92 U/L
ALT SERPL W/O P-5'-P-CCNC: 18 U/L
ANION GAP SERPL CALC-SCNC: 11 MMOL/L
AST SERPL-CCNC: 23 U/L
BILIRUB SERPL-MCNC: 0.5 MG/DL
BUN SERPL-MCNC: 15 MG/DL
CALCIUM SERPL-MCNC: 10.3 MG/DL
CHLORIDE SERPL-SCNC: 105 MMOL/L
CHOLEST SERPL-MCNC: 193 MG/DL
CHOLEST/HDLC SERPL: 2.5 {RATIO}
CO2 SERPL-SCNC: 24 MMOL/L
CREAT SERPL-MCNC: 0.9 MG/DL
EST. GFR  (AFRICAN AMERICAN): >60 ML/MIN/1.73 M^2
EST. GFR  (NON AFRICAN AMERICAN): 56.1 ML/MIN/1.73 M^2
GLUCOSE SERPL-MCNC: 110 MG/DL
HDLC SERPL-MCNC: 77 MG/DL
HDLC SERPL: 39.9 %
LDLC SERPL CALC-MCNC: 92.8 MG/DL
MAGNESIUM SERPL-MCNC: 2.2 MG/DL
NONHDLC SERPL-MCNC: 116 MG/DL
POTASSIUM SERPL-SCNC: 4.8 MMOL/L
PROT SERPL-MCNC: 7.6 G/DL
SODIUM SERPL-SCNC: 140 MMOL/L
TRIGL SERPL-MCNC: 116 MG/DL

## 2019-02-19 PROCEDURE — 80061 LIPID PANEL: CPT | Mod: HCNC

## 2019-02-19 PROCEDURE — 83735 ASSAY OF MAGNESIUM: CPT | Mod: HCNC

## 2019-02-19 PROCEDURE — 80053 COMPREHEN METABOLIC PANEL: CPT | Mod: HCNC

## 2019-02-19 PROCEDURE — 82306 VITAMIN D 25 HYDROXY: CPT | Mod: HCNC

## 2019-02-19 PROCEDURE — 36415 COLL VENOUS BLD VENIPUNCTURE: CPT | Mod: HCNC,PN

## 2019-02-23 RX ORDER — SERTRALINE HYDROCHLORIDE 50 MG/1
TABLET, FILM COATED ORAL
Qty: 90 TABLET | Refills: 1 | Status: SHIPPED | OUTPATIENT
Start: 2019-02-23 | End: 2019-08-21 | Stop reason: SDUPTHER

## 2019-02-26 ENCOUNTER — OFFICE VISIT (OUTPATIENT)
Dept: FAMILY MEDICINE | Facility: CLINIC | Age: 84
End: 2019-02-26
Payer: MEDICARE

## 2019-02-26 VITALS
WEIGHT: 138.88 LBS | BODY MASS INDEX: 25.55 KG/M2 | SYSTOLIC BLOOD PRESSURE: 122 MMHG | HEIGHT: 62 IN | HEART RATE: 72 BPM | DIASTOLIC BLOOD PRESSURE: 76 MMHG

## 2019-02-26 DIAGNOSIS — R73.01 IMPAIRED FASTING GLUCOSE: ICD-10-CM

## 2019-02-26 DIAGNOSIS — E78.00 PURE HYPERCHOLESTEROLEMIA: ICD-10-CM

## 2019-02-26 DIAGNOSIS — F41.8 ANXIETY WITH DEPRESSION: ICD-10-CM

## 2019-02-26 DIAGNOSIS — R41.89 COGNITIVE IMPAIRMENT: ICD-10-CM

## 2019-02-26 DIAGNOSIS — I10 ESSENTIAL HYPERTENSION: Primary | ICD-10-CM

## 2019-02-26 DIAGNOSIS — F03.90 SENILE DEMENTIA WITHOUT BEHAVIORAL DISTURBANCE: ICD-10-CM

## 2019-02-26 DIAGNOSIS — E55.9 VITAMIN D DEFICIENCY: ICD-10-CM

## 2019-02-26 DIAGNOSIS — M81.0 AGE-RELATED OSTEOPOROSIS WITHOUT CURRENT PATHOLOGICAL FRACTURE: ICD-10-CM

## 2019-02-26 PROCEDURE — 99999 PR PBB SHADOW E&M-EST. PATIENT-LVL III: ICD-10-PCS | Mod: PBBFAC,HCNC,, | Performed by: INTERNAL MEDICINE

## 2019-02-26 PROCEDURE — 1101F PT FALLS ASSESS-DOCD LE1/YR: CPT | Mod: HCNC,CPTII,S$GLB, | Performed by: INTERNAL MEDICINE

## 2019-02-26 PROCEDURE — 99214 PR OFFICE/OUTPT VISIT, EST, LEVL IV, 30-39 MIN: ICD-10-PCS | Mod: HCNC,S$GLB,, | Performed by: INTERNAL MEDICINE

## 2019-02-26 PROCEDURE — 99999 PR PBB SHADOW E&M-EST. PATIENT-LVL III: CPT | Mod: PBBFAC,HCNC,, | Performed by: INTERNAL MEDICINE

## 2019-02-26 PROCEDURE — 1101F PR PT FALLS ASSESS DOC 0-1 FALLS W/OUT INJ PAST YR: ICD-10-PCS | Mod: HCNC,CPTII,S$GLB, | Performed by: INTERNAL MEDICINE

## 2019-02-26 PROCEDURE — 99214 OFFICE O/P EST MOD 30 MIN: CPT | Mod: HCNC,S$GLB,, | Performed by: INTERNAL MEDICINE

## 2019-02-26 NOTE — PROGRESS NOTES
"Subjective:       Patient ID: Margarita Cruz is a 89 y.o. female.    Chief Complaint: Annual Exam    HPI  Overall she's been doing fine..labs reviewed w pt and her daughter.   HTN: watches her salt intake; BP here 122/76; doesn't take at home.   HLP: on pravastatin, and tolerates it fine.   Anxiety w depression; happy w sertraline helping her.   IFBS: doesn't watch her carbs of note.   Osteoporosis w vit D deficiency; stressed importance of walking regularly; takes calcium w vit D.   Doesn't desire to update her DEXA scan.   Acute renal insuff; could be drinking more fluids during daytime. No NSAID agents; uses tylenol.     Review of Systems   Constitutional: Negative for appetite change and fever.   HENT: Negative for congestion, postnasal drip, rhinorrhea and sinus pressure.    Eyes: Negative for discharge and itching.   Respiratory: Negative for cough, chest tightness, shortness of breath and wheezing.    Cardiovascular: Negative for chest pain, palpitations and leg swelling.   Gastrointestinal: Negative for abdominal distention, abdominal pain, blood in stool, constipation, diarrhea, nausea and vomiting.   Endocrine: Negative for polydipsia, polyphagia and polyuria.   Genitourinary: Negative for dysuria and hematuria.   Musculoskeletal: Negative for arthralgias and myalgias.   Skin: Negative for rash.   Allergic/Immunologic: Negative for environmental allergies and food allergies.   Neurological: Negative for tremors, seizures and syncope.        Memory about the same.    Hematological: Negative for adenopathy. Does not bruise/bleed easily.   Psychiatric/Behavioral:        Denies anxiety or depression.        Objective:      Vitals:    02/26/19 1436   BP: 122/76   Pulse: 72   Weight: 63 kg (138 lb 14.2 oz)   Height: 5' 2" (1.575 m)     Body mass index is 25.4 kg/m².    Physical Exam    Assessment:       1. Essential hypertension    2. Pure hypercholesterolemia    3. Impaired fasting glucose    4. Anxiety with " depression    5. Senile dementia without behavioral disturbance    6. Cognitive impairment    7. Age-related osteoporosis without current pathological fracture    8. Vitamin D deficiency        Plan:      Essential hypertension. Maintain < 2 Gm Na a day diet, and monitor BP at home; keep a log. Rahul treatment.   -     CBC auto differential; Future; Expected date: 02/26/2019  -     Comprehensive metabolic panel; Future; Expected date: 02/26/2019  -     Hemoglobin A1c; Future; Expected date: 02/26/2019  -     Lipid panel; Future; Expected date: 02/26/2019  -     Urinalysis; Future; Expected date: 02/26/2019    Pure hypercholesterolemia; cont pravastatin.   -     Comprehensive metabolic panel; Future; Expected date: 02/26/2019  -     Lipid panel; Future; Expected date: 02/26/2019    Impaired fasting glucose. Exercise recommended with weight reduction and low carb diet; we'll follow hemoglobin A1c's with you periodically.  -     Comprehensive metabolic panel; Future; Expected date: 02/26/2019  -     Hemoglobin A1c; Future; Expected date: 02/26/2019    Anxiety with depression; doing fine w sertraline.     Senile dementia without behavioral disturbance; on namaneda/donepezil.    Cognitive impairment; on namaneda/donepezil.     Age-related osteoporosis without current pathological fracture. Weight bearing exercises, continue calcium and vit D supplements. DEXA scabn at 2 yr intervals as needed.    Vitamin D deficiency; same supplements.   -     Comprehensive metabolic panel; Future; Expected date: 02/26/2019  -     Vitamin D; Future; Expected date: 02/26/2019    Other orders  -     Magnesium; Future; Expected date: 02/26/2019  -     TSH; Future; Expected date: 02/26/2019  -     T4, free; Future; Expected date: 02/26/2019, and free T3.

## 2019-02-26 NOTE — PATIENT INSTRUCTIONS
Essential hypertension. Maintain < 2 Gm Na a day diet, and monitor BP at home; keep a log. Rahul treatment.   -     CBC auto differential; Future; Expected date: 02/26/2019  -     Comprehensive metabolic panel; Future; Expected date: 02/26/2019  -     Hemoglobin A1c; Future; Expected date: 02/26/2019  -     Lipid panel; Future; Expected date: 02/26/2019  -     Urinalysis; Future; Expected date: 02/26/2019    Pure hypercholesterolemia; cont pravastatin.   -     Comprehensive metabolic panel; Future; Expected date: 02/26/2019  -     Lipid panel; Future; Expected date: 02/26/2019    Impaired fasting glucose. Exercise recommended with weight reduction and low carb diet; we'll follow hemoglobin A1c's with you periodically.  -     Comprehensive metabolic panel; Future; Expected date: 02/26/2019  -     Hemoglobin A1c; Future; Expected date: 02/26/2019    Anxiety with depression; doing fine w sertraline.     Senile dementia without behavioral disturbance; on namaneda/donepezil.    Cognitive impairment; on namaneda/donepezil.     Age-related osteoporosis without current pathological fracture. Weight bearing exercises, continue calcium and vit D supplements. DEXA scabn at 2 yr intervals as needed.    Vitamin D deficiency; same supplements.   -     Comprehensive metabolic panel; Future; Expected date: 02/26/2019  -     Vitamin D; Future; Expected date: 02/26/2019    Other orders  -     Magnesium; Future; Expected date: 02/26/2019  -     TSH; Future; Expected date: 02/26/2019  -     T4, free; Future; Expected date: 02/26/2019, and free T3.

## 2019-03-03 DIAGNOSIS — F03.90 SENILE DEMENTIA WITHOUT BEHAVIORAL DISTURBANCE: ICD-10-CM

## 2019-03-04 RX ORDER — MEMANTINE HYDROCHLORIDE 5 MG/1
TABLET ORAL
Qty: 30 TABLET | Refills: 3 | Status: SHIPPED | OUTPATIENT
Start: 2019-03-04 | End: 2019-05-01 | Stop reason: SDUPTHER

## 2019-03-09 RX ORDER — AMLODIPINE BESYLATE 5 MG/1
TABLET ORAL
Qty: 90 TABLET | Refills: 1 | Status: SHIPPED | OUTPATIENT
Start: 2019-03-09 | End: 2019-09-01 | Stop reason: SDUPTHER

## 2019-05-01 DIAGNOSIS — F03.90 SENILE DEMENTIA WITHOUT BEHAVIORAL DISTURBANCE: ICD-10-CM

## 2019-05-02 RX ORDER — MEMANTINE HYDROCHLORIDE 5 MG/1
TABLET ORAL
Qty: 90 TABLET | Refills: 1 | Status: SHIPPED | OUTPATIENT
Start: 2019-05-02 | End: 2019-10-24 | Stop reason: SDUPTHER

## 2019-05-02 RX ORDER — HYDRALAZINE HYDROCHLORIDE 25 MG/1
TABLET, FILM COATED ORAL
Qty: 180 TABLET | Refills: 1 | Status: SHIPPED | OUTPATIENT
Start: 2019-05-02 | End: 2019-10-24 | Stop reason: SDUPTHER

## 2019-06-06 DIAGNOSIS — R41.89 COGNITIVE IMPAIRMENT: ICD-10-CM

## 2019-06-06 RX ORDER — DONEPEZIL HYDROCHLORIDE 5 MG/1
TABLET, FILM COATED ORAL
Qty: 90 TABLET | Refills: 1 | Status: SHIPPED | OUTPATIENT
Start: 2019-06-06 | End: 2019-11-25 | Stop reason: SDUPTHER

## 2019-08-22 ENCOUNTER — PATIENT OUTREACH (OUTPATIENT)
Dept: ADMINISTRATIVE | Facility: HOSPITAL | Age: 84
End: 2019-08-22

## 2019-08-24 RX ORDER — SERTRALINE HYDROCHLORIDE 50 MG/1
TABLET, FILM COATED ORAL
Qty: 90 TABLET | Refills: 3 | Status: SHIPPED | OUTPATIENT
Start: 2019-08-24 | End: 2020-09-29

## 2019-09-02 RX ORDER — AMLODIPINE BESYLATE 5 MG/1
TABLET ORAL
Qty: 90 TABLET | Refills: 3 | Status: SHIPPED | OUTPATIENT
Start: 2019-09-02 | End: 2020-09-29

## 2019-09-04 ENCOUNTER — LAB VISIT (OUTPATIENT)
Dept: LAB | Facility: HOSPITAL | Age: 84
End: 2019-09-04
Attending: INTERNAL MEDICINE
Payer: MEDICARE

## 2019-09-04 DIAGNOSIS — I10 ESSENTIAL HYPERTENSION: ICD-10-CM

## 2019-09-04 DIAGNOSIS — E55.9 VITAMIN D DEFICIENCY: ICD-10-CM

## 2019-09-04 DIAGNOSIS — E78.00 PURE HYPERCHOLESTEROLEMIA: ICD-10-CM

## 2019-09-04 DIAGNOSIS — F41.8 ANXIETY WITH DEPRESSION: ICD-10-CM

## 2019-09-04 DIAGNOSIS — M81.0 AGE-RELATED OSTEOPOROSIS WITHOUT CURRENT PATHOLOGICAL FRACTURE: ICD-10-CM

## 2019-09-04 DIAGNOSIS — R73.01 IMPAIRED FASTING GLUCOSE: ICD-10-CM

## 2019-09-04 LAB
ALBUMIN SERPL BCP-MCNC: 3.9 G/DL (ref 3.5–5.2)
ALP SERPL-CCNC: 82 U/L (ref 55–135)
ALT SERPL W/O P-5'-P-CCNC: 14 U/L (ref 10–44)
ANION GAP SERPL CALC-SCNC: 11 MMOL/L (ref 8–16)
AST SERPL-CCNC: 21 U/L (ref 10–40)
BASOPHILS # BLD AUTO: 0.07 K/UL (ref 0–0.2)
BASOPHILS NFR BLD: 0.9 % (ref 0–1.9)
BILIRUB SERPL-MCNC: 0.6 MG/DL (ref 0.1–1)
BUN SERPL-MCNC: 17 MG/DL (ref 10–30)
CALCIUM SERPL-MCNC: 9.8 MG/DL (ref 8.7–10.5)
CHLORIDE SERPL-SCNC: 105 MMOL/L (ref 95–110)
CHOLEST SERPL-MCNC: 168 MG/DL (ref 120–199)
CHOLEST/HDLC SERPL: 2.6 {RATIO} (ref 2–5)
CO2 SERPL-SCNC: 23 MMOL/L (ref 23–29)
CREAT SERPL-MCNC: 0.8 MG/DL (ref 0.5–1.4)
DIFFERENTIAL METHOD: ABNORMAL
EOSINOPHIL # BLD AUTO: 0.2 K/UL (ref 0–0.5)
EOSINOPHIL NFR BLD: 2.8 % (ref 0–8)
ERYTHROCYTE [DISTWIDTH] IN BLOOD BY AUTOMATED COUNT: 14.4 % (ref 11.5–14.5)
EST. GFR  (AFRICAN AMERICAN): >60 ML/MIN/1.73 M^2
EST. GFR  (NON AFRICAN AMERICAN): >60 ML/MIN/1.73 M^2
ESTIMATED AVG GLUCOSE: 114 MG/DL (ref 68–131)
GLUCOSE SERPL-MCNC: 114 MG/DL (ref 70–110)
HBA1C MFR BLD HPLC: 5.6 % (ref 4–5.6)
HCT VFR BLD AUTO: 42.7 % (ref 37–48.5)
HDLC SERPL-MCNC: 65 MG/DL (ref 40–75)
HDLC SERPL: 38.7 % (ref 20–50)
HGB BLD-MCNC: 13 G/DL (ref 12–16)
IMM GRANULOCYTES # BLD AUTO: 0.02 K/UL (ref 0–0.04)
IMM GRANULOCYTES NFR BLD AUTO: 0.3 % (ref 0–0.5)
LDLC SERPL CALC-MCNC: 78.8 MG/DL (ref 63–159)
LYMPHOCYTES # BLD AUTO: 2.9 K/UL (ref 1–4.8)
LYMPHOCYTES NFR BLD: 37.2 % (ref 18–48)
MAGNESIUM SERPL-MCNC: 2 MG/DL (ref 1.6–2.6)
MCH RBC QN AUTO: 28.1 PG (ref 27–31)
MCHC RBC AUTO-ENTMCNC: 30.4 G/DL (ref 32–36)
MCV RBC AUTO: 92 FL (ref 82–98)
MONOCYTES # BLD AUTO: 0.6 K/UL (ref 0.3–1)
MONOCYTES NFR BLD: 7.2 % (ref 4–15)
NEUTROPHILS # BLD AUTO: 4 K/UL (ref 1.8–7.7)
NEUTROPHILS NFR BLD: 51.6 % (ref 38–73)
NONHDLC SERPL-MCNC: 103 MG/DL
NRBC BLD-RTO: 0 /100 WBC
PLATELET # BLD AUTO: 267 K/UL (ref 150–350)
PMV BLD AUTO: 11.3 FL (ref 9.2–12.9)
POTASSIUM SERPL-SCNC: 4.2 MMOL/L (ref 3.5–5.1)
PROT SERPL-MCNC: 7.1 G/DL (ref 6–8.4)
RBC # BLD AUTO: 4.62 M/UL (ref 4–5.4)
SODIUM SERPL-SCNC: 139 MMOL/L (ref 136–145)
T4 FREE SERPL-MCNC: 1.06 NG/DL (ref 0.71–1.51)
TRIGL SERPL-MCNC: 121 MG/DL (ref 30–150)
WBC # BLD AUTO: 7.77 K/UL (ref 3.9–12.7)

## 2019-09-04 PROCEDURE — 85025 COMPLETE CBC W/AUTO DIFF WBC: CPT | Mod: HCNC

## 2019-09-04 PROCEDURE — 80053 COMPREHEN METABOLIC PANEL: CPT | Mod: HCNC

## 2019-09-04 PROCEDURE — 36415 COLL VENOUS BLD VENIPUNCTURE: CPT | Mod: HCNC,PN

## 2019-09-04 PROCEDURE — 84439 ASSAY OF FREE THYROXINE: CPT | Mod: HCNC

## 2019-09-04 PROCEDURE — 82306 VITAMIN D 25 HYDROXY: CPT | Mod: HCNC

## 2019-09-04 PROCEDURE — 83735 ASSAY OF MAGNESIUM: CPT | Mod: HCNC

## 2019-09-04 PROCEDURE — 83036 HEMOGLOBIN GLYCOSYLATED A1C: CPT | Mod: HCNC

## 2019-09-04 PROCEDURE — 80061 LIPID PANEL: CPT | Mod: HCNC

## 2019-09-04 PROCEDURE — 84481 FREE ASSAY (FT-3): CPT | Mod: HCNC

## 2019-09-05 ENCOUNTER — OFFICE VISIT (OUTPATIENT)
Dept: FAMILY MEDICINE | Facility: CLINIC | Age: 84
End: 2019-09-05
Payer: MEDICARE

## 2019-09-05 VITALS
HEIGHT: 62 IN | DIASTOLIC BLOOD PRESSURE: 52 MMHG | HEART RATE: 71 BPM | SYSTOLIC BLOOD PRESSURE: 146 MMHG | TEMPERATURE: 98 F | OXYGEN SATURATION: 97 % | WEIGHT: 139.69 LBS | BODY MASS INDEX: 25.7 KG/M2

## 2019-09-05 DIAGNOSIS — I10 ESSENTIAL HYPERTENSION: Primary | ICD-10-CM

## 2019-09-05 DIAGNOSIS — M81.0 OSTEOPOROSIS, UNSPECIFIED OSTEOPOROSIS TYPE, UNSPECIFIED PATHOLOGICAL FRACTURE PRESENCE: ICD-10-CM

## 2019-09-05 DIAGNOSIS — E55.9 VITAMIN D DEFICIENCY: ICD-10-CM

## 2019-09-05 DIAGNOSIS — R73.01 IMPAIRED FASTING GLUCOSE: ICD-10-CM

## 2019-09-05 DIAGNOSIS — E78.49 OTHER HYPERLIPIDEMIA: ICD-10-CM

## 2019-09-05 DIAGNOSIS — F03.90 SENILE DEMENTIA WITHOUT BEHAVIORAL DISTURBANCE: ICD-10-CM

## 2019-09-05 DIAGNOSIS — K27.9 PUD (PEPTIC ULCER DISEASE): ICD-10-CM

## 2019-09-05 DIAGNOSIS — F41.8 ANXIETY WITH DEPRESSION: ICD-10-CM

## 2019-09-05 LAB
25(OH)D3+25(OH)D2 SERPL-MCNC: 49 NG/ML (ref 30–96)
T3FREE SERPL-MCNC: 3 PG/ML (ref 2.3–4.2)

## 2019-09-05 PROCEDURE — 99999 PR PBB SHADOW E&M-EST. PATIENT-LVL III: CPT | Mod: PBBFAC,HCNC,, | Performed by: INTERNAL MEDICINE

## 2019-09-05 PROCEDURE — 99214 OFFICE O/P EST MOD 30 MIN: CPT | Mod: HCNC,S$GLB,, | Performed by: INTERNAL MEDICINE

## 2019-09-05 PROCEDURE — 99999 PR PBB SHADOW E&M-EST. PATIENT-LVL III: ICD-10-PCS | Mod: PBBFAC,HCNC,, | Performed by: INTERNAL MEDICINE

## 2019-09-05 PROCEDURE — 99214 PR OFFICE/OUTPT VISIT, EST, LEVL IV, 30-39 MIN: ICD-10-PCS | Mod: HCNC,S$GLB,, | Performed by: INTERNAL MEDICINE

## 2019-09-05 PROCEDURE — 1101F PR PT FALLS ASSESS DOC 0-1 FALLS W/OUT INJ PAST YR: ICD-10-PCS | Mod: HCNC,CPTII,S$GLB, | Performed by: INTERNAL MEDICINE

## 2019-09-05 PROCEDURE — 1101F PT FALLS ASSESS-DOCD LE1/YR: CPT | Mod: HCNC,CPTII,S$GLB, | Performed by: INTERNAL MEDICINE

## 2019-09-05 NOTE — PATIENT INSTRUCTIONS
Essential hypertension.Maintain < 2 Gm Na a day diet, and monitor BP at home; keep a log. Same tx.   -     Hemoglobin A1c; Future; Expected date: 09/05/2019  -     Lipid panel; Future; Expected date: 09/05/2019  -     Comprehensive metabolic panel; Future; Expected date: 09/05/2019  -     TSH; Future; Expected date: 09/05/2019  -     CBC auto differential; Future; Expected date: 09/05/2019    Anxiety with depression; limit caffeine; on sertraline.   -     TSH; Future; Expected date: 09/05/2019  -     Vitamin B12; Future; Expected date: 09/05/2019  -     Folate; Future; Expected date: 09/05/2019  -     Vitamin D; Future; Expected date: 09/05/2019    Other hyperlipidemia.Maintain low fat high fiber diet, exercise regularly. Weight reduction where indicated. Cont pravastatin  -     Lipid panel; Future; Expected date: 09/05/2019  -     Comprehensive metabolic panel; Future; Expected date: 09/05/2019    Osteoporosis, unspecified osteoporosis type, unspecified pathological fracture presence. Weight bearing exercises, continue calcium and vit D supplements.  -     Comprehensive metabolic panel; Future; Expected date: 09/05/2019  -     Vitamin D; Future; Expected date: 09/05/2019    Senile dementia without behavioral disturbance; cont aricept and namenda.   -     Vitamin B12; Future; Expected date: 09/05/2019  -     Folate; Future; Expected date: 09/05/2019  -     Vitamin D; Future; Expected date: 09/05/2019    Vitamin D deficiency; same supplements.   -     Vitamin D; Future; Expected date: 09/05/2019    PUD (peptic ulcer disease); on omeprazole.   -     CBC auto differential; Future; Expected date: 09/05/2019    Impaired fasting glucose.Exercise recommended with weight reduction and low carb diet; we'll follow hemoglobin A1c's with you periodically.       Hemoglobin A1c; Future; Expected date: 09/05/2019  -     Comprehensive metabolic panel; Future; Expected date: 09/05/2019

## 2019-09-05 NOTE — PROGRESS NOTES
Subjective:       Patient ID: Margarita Cruz is a 89 y.o. female.    Chief Complaint: Follow-up and Immunizations    HPI  Here today for reassessment and to go over her labs.  Present here today with her daughter who is an RN.  Other hyperlipidemia:  On low-fat high-fiber diet or tries; tolerates a pravastatin fine.  History of peptic ulcer disease:  No heartburn abdominal pain or belching.  On omeprazole 20 mg per day.  Senile dementia without behavioral disturbance:  Short-term memory is still an issue; on Aricept and Namenda generics; has seen Neurology and suspected to have age related    dementia.  Anxiety with depression:  Doing fine on sertraline 50 mg per day; helps; she has been sleeping okay.  Essential hypertension:  Watches her salt intake or tries; blood pressure at home has been 130s to low 140s over 50-70; here her blood pressure manually is 148/52.  She has a    daughter in the hospital with a pancreatic mass which has her a bit on edge.  Labs reviewed and discussed at length in ordered for follow-up.  Medications also reviewed and addressed.    Review of Systems   Constitutional: Negative for appetite change, fever and unexpected weight change.   HENT: Negative for congestion, postnasal drip and rhinorrhea.    Respiratory: Negative for cough, chest tightness, shortness of breath and wheezing.    Cardiovascular: Negative for chest pain, palpitations and leg swelling.   Gastrointestinal: Negative for abdominal pain, blood in stool, constipation, diarrhea, nausea and vomiting.   Genitourinary: Negative for dysuria and hematuria.   Musculoskeletal: Negative for arthralgias and myalgias.   Skin: Negative for rash.   Allergic/Immunologic: Negative for environmental allergies and food allergies.   Neurological: Negative for syncope, weakness and light-headedness.   Hematological: Negative for adenopathy. Does not bruise/bleed easily.   Psychiatric/Behavioral: Negative for dysphoric mood. The patient is  "not nervous/anxious.         No anxiety or depression; controlled w sertraline.        Objective:      Vitals:    09/05/19 1419 09/05/19 1451   BP: (!) 148/52 (!) 146/52   BP Location: Left arm Left arm   Patient Position: Sitting Sitting   BP Method: Medium (Manual) Medium (Manual)   Pulse: 71    Temp: 98 °F (36.7 °C)    TempSrc: Oral    SpO2: 97%    Weight: 63.3 kg (139 lb 10.6 oz)    Height: 5' 2" (1.575 m)      Body mass index is 25.54 kg/m².    Physical Exam   Constitutional: She is oriented to person, place, and time. She appears well-developed and well-nourished.   HENT:   Head: Normocephalic and atraumatic.   Eyes: EOM are normal.   Neck: Normal range of motion. Neck supple. No thyromegaly present.   No carotid bruit heard.    Cardiovascular: Normal rate, regular rhythm and normal heart sounds. Exam reveals no gallop.   No murmur heard.  Pulmonary/Chest: Effort normal and breath sounds normal. No respiratory distress. She has no wheezes. She has no rales.   Abdominal: Soft. Bowel sounds are normal. She exhibits no distension. There is no tenderness. There is no rebound and no guarding.   Musculoskeletal: Normal range of motion. She exhibits no edema.   Lymphadenopathy:     She has no cervical adenopathy.   Neurological: She is alert and oriented to person, place, and time.   Moves all 4 extremities fine.   Skin: No rash noted.   Psychiatric: She has a normal mood and affect. Her behavior is normal. Thought content normal.   Vitals reviewed.      Assessment:       1. Essential hypertension    2. Anxiety with depression    3. Other hyperlipidemia    4. Osteoporosis, unspecified osteoporosis type, unspecified pathological fracture presence    5. Senile dementia without behavioral disturbance    6. Vitamin D deficiency    7. PUD (peptic ulcer disease)    8. Impaired fasting glucose        Plan:       Essential hypertension. Maintain < 2 Gm Na a day diet, and monitor BP at home; keep a log. Same tx.   -     " Hemoglobin A1c; Future; Expected date: 09/05/2019  -     Lipid panel; Future; Expected date: 09/05/2019  -     Comprehensive metabolic panel; Future; Expected date: 09/05/2019  -     TSH; Future; Expected date: 09/05/2019  -     CBC auto differential; Future; Expected date: 09/05/2019    Anxiety with depression; limit caffeine; on sertraline.  Helps a fair amount.  Light exercise and stress reduction.  -     TSH; Future; Expected date: 09/05/2019  -     Vitamin B12; Future; Expected date: 09/05/2019  -     Folate; Future; Expected date: 09/05/2019  -     Vitamin D; Future; Expected date: 09/05/2019    Other hyperlipidemia. Maintain low fat high fiber diet, exercise regularly. Weight reduction where indicated. Cont pravastatin  -     Lipid panel; Future; Expected date: 09/05/2019  -     Comprehensive metabolic panel; Future; Expected date: 09/05/2019    Osteoporosis, unspecified osteoporosis type, unspecified pathological fracture presence. Weight bearing exercises, continue calcium and vit D supplements.  Does not desire to have DEXA scans performed any further.  -     Comprehensive metabolic panel; Future; Expected date: 09/05/2019  -     Vitamin D; Future; Expected date: 09/05/2019    Senile dementia without behavioral disturbance; continue aricept and namenda.   -     Vitamin B12; Future; Expected date: 09/05/2019  -     Folate; Future; Expected date: 09/05/2019  -     Vitamin D; Future; Expected date: 09/05/2019    Vitamin D deficiency; same supplements.  Continue calcium supplements as well.  -     Vitamin D; Future; Expected date: 09/05/2019    PUD (peptic ulcer disease); history of; on omeprazole.   -     CBC auto differential; Future; Expected date: 09/05/2019    Impaired fasting glucose. Exercise recommended with weight reduction and low carb diet; we'll follow hemoglobin A1c's with you periodically.       Hemoglobin A1c; Future; Expected date: 09/05/2019  -     Comprehensive metabolic panel; Future;  Expected date: 09/05/2019

## 2019-10-24 DIAGNOSIS — F03.90 SENILE DEMENTIA WITHOUT BEHAVIORAL DISTURBANCE: ICD-10-CM

## 2019-10-24 RX ORDER — HYDRALAZINE HYDROCHLORIDE 25 MG/1
TABLET, FILM COATED ORAL
Qty: 180 TABLET | Refills: 1 | Status: SHIPPED | OUTPATIENT
Start: 2019-10-24 | End: 2020-04-21

## 2019-10-24 RX ORDER — MEMANTINE HYDROCHLORIDE 5 MG/1
TABLET ORAL
Qty: 90 TABLET | Refills: 1 | Status: SHIPPED | OUTPATIENT
Start: 2019-10-24 | End: 2020-04-21

## 2019-10-24 RX ORDER — PRAVASTATIN SODIUM 20 MG/1
20 TABLET ORAL NIGHTLY
Qty: 90 TABLET | Refills: 3 | Status: SHIPPED | OUTPATIENT
Start: 2019-10-24 | End: 2020-08-04 | Stop reason: SDUPTHER

## 2019-10-24 RX ORDER — PRAVASTATIN SODIUM 40 MG/1
20 TABLET ORAL NIGHTLY
OUTPATIENT
Start: 2019-10-24

## 2019-11-25 DIAGNOSIS — R41.89 COGNITIVE IMPAIRMENT: ICD-10-CM

## 2019-11-25 RX ORDER — DONEPEZIL HYDROCHLORIDE 5 MG/1
TABLET, FILM COATED ORAL
Qty: 90 TABLET | Refills: 1 | Status: SHIPPED | OUTPATIENT
Start: 2019-11-25 | End: 2020-06-10

## 2020-04-19 DIAGNOSIS — F03.90 SENILE DEMENTIA WITHOUT BEHAVIORAL DISTURBANCE: ICD-10-CM

## 2020-04-21 RX ORDER — MEMANTINE HYDROCHLORIDE 5 MG/1
TABLET ORAL
Qty: 90 TABLET | Refills: 0 | Status: SHIPPED | OUTPATIENT
Start: 2020-04-21 | End: 2023-03-05

## 2020-04-21 RX ORDER — HYDRALAZINE HYDROCHLORIDE 25 MG/1
TABLET, FILM COATED ORAL
Qty: 180 TABLET | Refills: 0 | Status: SHIPPED | OUTPATIENT
Start: 2020-04-21 | End: 2020-07-18

## 2020-04-21 NOTE — TELEPHONE ENCOUNTER
Approved one time only. Needs appt.  Please line up for virtual visit somewhere within the next few weeks; patient may need assistance from her daughter

## 2020-04-21 NOTE — TELEPHONE ENCOUNTER
Spoke to daughter of patient gave her providers instructions. Scheduled a audio visit for 4/23/2020. Daughter gave a verbal understanding

## 2020-04-23 ENCOUNTER — OFFICE VISIT (OUTPATIENT)
Dept: FAMILY MEDICINE | Facility: CLINIC | Age: 85
End: 2020-04-23
Payer: MEDICARE

## 2020-04-23 DIAGNOSIS — I10 ESSENTIAL HYPERTENSION: Primary | ICD-10-CM

## 2020-04-23 DIAGNOSIS — K21.9 GASTROESOPHAGEAL REFLUX DISEASE, ESOPHAGITIS PRESENCE NOT SPECIFIED: ICD-10-CM

## 2020-04-23 DIAGNOSIS — F03.90 SENILE DEMENTIA WITHOUT BEHAVIORAL DISTURBANCE: ICD-10-CM

## 2020-04-23 DIAGNOSIS — E78.49 OTHER HYPERLIPIDEMIA: ICD-10-CM

## 2020-04-23 DIAGNOSIS — F41.8 ANXIETY WITH DEPRESSION: ICD-10-CM

## 2020-04-23 PROCEDURE — 99441 PR PHYSICIAN TELEPHONE EVALUATION 5-10 MIN: CPT | Mod: HCNC,95,, | Performed by: INTERNAL MEDICINE

## 2020-04-23 PROCEDURE — 99441 PR PHYSICIAN TELEPHONE EVALUATION 5-10 MIN: ICD-10-PCS | Mod: HCNC,95,, | Performed by: INTERNAL MEDICINE

## 2020-04-23 NOTE — PROGRESS NOTES
Established Patient - Audio Only Telehealth Visit     The patient location is:  Home  The chief complaint leading to consultation is:  Reassessment; case discussed with patient and her daughter who is an RN.  September 2019 ordered labs not obtained yet  Visit type: Virtual visit with audio only (telephone):  10:20 to 10:30 a.m.     The reason for the audio only service rather than synchronous audio and video virtual visit was related to technical difficulties or patient preference/necessity.     Each patient to whom I provide medical services by telemedicine is:  (1) informed of the relationship between the physician and patient and the respective role of any other health care provider with respect to management of the patient; and (2) notified that they may decline to receive medical services by telemedicine and may withdraw from such care at any time. Patient verbally consented to receive this service via voice-only telephone call.       HPI:  Patient here today for reassessment.  General anxiety disorder-depression:  Overall has been doing as good as can be expected; on sertraline 50 mg daily.  Essential hypertension:  Blood pressure 133/60 with pulse 89 no lower extremity edema.  Last office visit was 09/05/2019.  Other hyperlipidemia:  Not on low-fat diet; tolerates pravastatin though fine.  09/04/2019 lipid profile with total cholesterol 168, HDL 65, LDL 78.  Senile dementia:  Memories about the same; on Aricept and Namenda generics.  GERD:  Knows not to take any bedtime snacks.  Omeprazole 20 mg a day controls it.     Assessment and plan:    General anxiety-depression doing well overall considering; continue sertraline at 50 mg per day.  Essential hypertension:: Maintain < 2 Gm Na a day diet, and monitor BP at home; keep a log.  Continue amlodipine and hydralazine.  Other hyperlipidemia:  Tolerates pravastatin fine not on low-fat diet.  LDLs therapeutic at 78 with goal less than 100.  Senile dementia:   Continue Aricept and Namenda as directed; members been about the same.  GERD:  Knows not to take any bedtime snacks; continue omeprazole 20 mg daily which controls her reflux.    Recommend seeing patient back in 3 months with fasting labs prior from 09/05/2019 ordering.  Call for any problems before then                        This service was not originating from a related E/M service provided within the previous 7 days nor will  to an E/M service or procedure within the next 24 hours or my soonest available appointment.  Prevailing standard of care was able to be met in this audio-only visit.

## 2020-04-23 NOTE — Clinical Note
Please schedule patient has follow-up appointment with me in 3 months with her obtaining her labs 1 week prior.  Labs to be obtained after an overnight fast of 8 hours

## 2020-04-29 PROBLEM — K21.9 GASTROESOPHAGEAL REFLUX DISEASE: Status: ACTIVE | Noted: 2020-04-29

## 2020-04-29 NOTE — PATIENT INSTRUCTIONS
Assessment and plan:  General anxiety-depression doing well overall considering; continue sertraline at 50 mg per day.  Essential hypertension:: Maintain < 2 Gm Na a day diet, and monitor BP at home; keep a log.  Continue amlodipine and hydralazine.  Other hyperlipidemia:  Tolerates pravastatin fine; not on low-fat diet.  LDLs therapeutic at 78 with goal less than 100.  Continue pravastatin.  Senile dementia:  Continue Aricept and Namenda as directed; members been about the same.  GERD:  Knows not to take any bedtime snacks; continue omeprazole 20 mg daily which controls her reflux.    Recommend seeing patient back in 3 months with fasting labs prior from 09/05/2019 ordering.  Call for any problems before then

## 2020-06-06 DIAGNOSIS — R41.89 COGNITIVE IMPAIRMENT: ICD-10-CM

## 2020-06-10 RX ORDER — DONEPEZIL HYDROCHLORIDE 5 MG/1
TABLET, FILM COATED ORAL
Qty: 90 TABLET | Refills: 1 | Status: SHIPPED | OUTPATIENT
Start: 2020-06-10 | End: 2021-10-07

## 2020-07-28 ENCOUNTER — LAB VISIT (OUTPATIENT)
Dept: LAB | Facility: HOSPITAL | Age: 85
End: 2020-07-28
Attending: INTERNAL MEDICINE
Payer: MEDICARE

## 2020-07-28 DIAGNOSIS — K27.9 PUD (PEPTIC ULCER DISEASE): ICD-10-CM

## 2020-07-28 DIAGNOSIS — R73.01 IMPAIRED FASTING GLUCOSE: ICD-10-CM

## 2020-07-28 DIAGNOSIS — E55.9 VITAMIN D DEFICIENCY: ICD-10-CM

## 2020-07-28 DIAGNOSIS — E78.49 OTHER HYPERLIPIDEMIA: ICD-10-CM

## 2020-07-28 DIAGNOSIS — M81.0 OSTEOPOROSIS, UNSPECIFIED OSTEOPOROSIS TYPE, UNSPECIFIED PATHOLOGICAL FRACTURE PRESENCE: ICD-10-CM

## 2020-07-28 DIAGNOSIS — F41.8 ANXIETY WITH DEPRESSION: ICD-10-CM

## 2020-07-28 DIAGNOSIS — F03.90 SENILE DEMENTIA WITHOUT BEHAVIORAL DISTURBANCE: ICD-10-CM

## 2020-07-28 DIAGNOSIS — I10 ESSENTIAL HYPERTENSION: ICD-10-CM

## 2020-07-28 LAB
25(OH)D3+25(OH)D2 SERPL-MCNC: 36 NG/ML (ref 30–96)
ALBUMIN SERPL BCP-MCNC: 3.8 G/DL (ref 3.5–5.2)
ALP SERPL-CCNC: 78 U/L (ref 55–135)
ALT SERPL W/O P-5'-P-CCNC: 14 U/L (ref 10–44)
ANION GAP SERPL CALC-SCNC: 8 MMOL/L (ref 8–16)
AST SERPL-CCNC: 19 U/L (ref 10–40)
BASOPHILS # BLD AUTO: 0.07 K/UL (ref 0–0.2)
BASOPHILS NFR BLD: 0.9 % (ref 0–1.9)
BILIRUB SERPL-MCNC: 0.7 MG/DL (ref 0.1–1)
BUN SERPL-MCNC: 17 MG/DL (ref 10–30)
CALCIUM SERPL-MCNC: 10 MG/DL (ref 8.7–10.5)
CHLORIDE SERPL-SCNC: 106 MMOL/L (ref 95–110)
CHOLEST SERPL-MCNC: 179 MG/DL (ref 120–199)
CHOLEST/HDLC SERPL: 2.7 {RATIO} (ref 2–5)
CO2 SERPL-SCNC: 26 MMOL/L (ref 23–29)
CREAT SERPL-MCNC: 0.9 MG/DL (ref 0.5–1.4)
DIFFERENTIAL METHOD: ABNORMAL
EOSINOPHIL # BLD AUTO: 0.2 K/UL (ref 0–0.5)
EOSINOPHIL NFR BLD: 2.1 % (ref 0–8)
ERYTHROCYTE [DISTWIDTH] IN BLOOD BY AUTOMATED COUNT: 14.9 % (ref 11.5–14.5)
EST. GFR  (AFRICAN AMERICAN): >60 ML/MIN/1.73 M^2
EST. GFR  (NON AFRICAN AMERICAN): 55.7 ML/MIN/1.73 M^2
FOLATE SERPL-MCNC: 16.5 NG/ML (ref 4–24)
GLUCOSE SERPL-MCNC: 97 MG/DL (ref 70–110)
HCT VFR BLD AUTO: 41.9 % (ref 37–48.5)
HDLC SERPL-MCNC: 67 MG/DL (ref 40–75)
HDLC SERPL: 37.4 % (ref 20–50)
HGB BLD-MCNC: 12.5 G/DL (ref 12–16)
IMM GRANULOCYTES # BLD AUTO: 0.02 K/UL (ref 0–0.04)
IMM GRANULOCYTES NFR BLD AUTO: 0.3 % (ref 0–0.5)
LDLC SERPL CALC-MCNC: 85.2 MG/DL (ref 63–159)
LYMPHOCYTES # BLD AUTO: 3.3 K/UL (ref 1–4.8)
LYMPHOCYTES NFR BLD: 42.4 % (ref 18–48)
MCH RBC QN AUTO: 26.5 PG (ref 27–31)
MCHC RBC AUTO-ENTMCNC: 29.8 G/DL (ref 32–36)
MCV RBC AUTO: 89 FL (ref 82–98)
MONOCYTES # BLD AUTO: 0.6 K/UL (ref 0.3–1)
MONOCYTES NFR BLD: 8.3 % (ref 4–15)
NEUTROPHILS # BLD AUTO: 3.6 K/UL (ref 1.8–7.7)
NEUTROPHILS NFR BLD: 46 % (ref 38–73)
NONHDLC SERPL-MCNC: 112 MG/DL
NRBC BLD-RTO: 0 /100 WBC
PLATELET # BLD AUTO: 285 K/UL (ref 150–350)
PMV BLD AUTO: 11.5 FL (ref 9.2–12.9)
POTASSIUM SERPL-SCNC: 4.7 MMOL/L (ref 3.5–5.1)
PROT SERPL-MCNC: 7 G/DL (ref 6–8.4)
RBC # BLD AUTO: 4.72 M/UL (ref 4–5.4)
SODIUM SERPL-SCNC: 140 MMOL/L (ref 136–145)
T4 FREE SERPL-MCNC: 1.15 NG/DL (ref 0.71–1.51)
TRIGL SERPL-MCNC: 134 MG/DL (ref 30–150)
TSH SERPL DL<=0.005 MIU/L-ACNC: 0.19 UIU/ML (ref 0.4–4)
VIT B12 SERPL-MCNC: 597 PG/ML (ref 210–950)
WBC # BLD AUTO: 7.71 K/UL (ref 3.9–12.7)

## 2020-07-28 PROCEDURE — 80053 COMPREHEN METABOLIC PANEL: CPT | Mod: HCNC

## 2020-07-28 PROCEDURE — 80061 LIPID PANEL: CPT | Mod: HCNC

## 2020-07-28 PROCEDURE — 83036 HEMOGLOBIN GLYCOSYLATED A1C: CPT | Mod: HCNC

## 2020-07-28 PROCEDURE — 82607 VITAMIN B-12: CPT | Mod: HCNC

## 2020-07-28 PROCEDURE — 84439 ASSAY OF FREE THYROXINE: CPT | Mod: HCNC

## 2020-07-28 PROCEDURE — 82746 ASSAY OF FOLIC ACID SERUM: CPT | Mod: HCNC

## 2020-07-28 PROCEDURE — 82306 VITAMIN D 25 HYDROXY: CPT | Mod: HCNC

## 2020-07-28 PROCEDURE — 84443 ASSAY THYROID STIM HORMONE: CPT | Mod: HCNC

## 2020-07-28 PROCEDURE — 85025 COMPLETE CBC W/AUTO DIFF WBC: CPT | Mod: HCNC

## 2020-07-28 PROCEDURE — 36415 COLL VENOUS BLD VENIPUNCTURE: CPT | Mod: HCNC,PN

## 2020-07-29 LAB
ESTIMATED AVG GLUCOSE: 117 MG/DL (ref 68–131)
HBA1C MFR BLD HPLC: 5.7 % (ref 4–5.6)

## 2020-08-04 ENCOUNTER — OFFICE VISIT (OUTPATIENT)
Dept: FAMILY MEDICINE | Facility: CLINIC | Age: 85
End: 2020-08-04
Payer: MEDICARE

## 2020-08-04 VITALS
OXYGEN SATURATION: 97 % | BODY MASS INDEX: 25.5 KG/M2 | HEIGHT: 62 IN | TEMPERATURE: 98 F | HEART RATE: 74 BPM | WEIGHT: 138.56 LBS | DIASTOLIC BLOOD PRESSURE: 56 MMHG | SYSTOLIC BLOOD PRESSURE: 134 MMHG

## 2020-08-04 DIAGNOSIS — F03.90 SENILE DEMENTIA WITHOUT BEHAVIORAL DISTURBANCE: ICD-10-CM

## 2020-08-04 DIAGNOSIS — F41.8 ANXIETY WITH DEPRESSION: ICD-10-CM

## 2020-08-04 DIAGNOSIS — E55.9 VITAMIN D DEFICIENCY: ICD-10-CM

## 2020-08-04 DIAGNOSIS — M81.0 AGE-RELATED OSTEOPOROSIS WITHOUT CURRENT PATHOLOGICAL FRACTURE: ICD-10-CM

## 2020-08-04 DIAGNOSIS — E05.90 SUBCLINICAL HYPERTHYROIDISM: ICD-10-CM

## 2020-08-04 DIAGNOSIS — I10 ESSENTIAL HYPERTENSION: Primary | ICD-10-CM

## 2020-08-04 DIAGNOSIS — E78.49 OTHER HYPERLIPIDEMIA: ICD-10-CM

## 2020-08-04 DIAGNOSIS — R73.01 IMPAIRED FASTING GLUCOSE: ICD-10-CM

## 2020-08-04 PROCEDURE — 99999 PR PBB SHADOW E&M-EST. PATIENT-LVL III: ICD-10-PCS | Mod: PBBFAC,HCNC,, | Performed by: INTERNAL MEDICINE

## 2020-08-04 PROCEDURE — 1159F PR MEDICATION LIST DOCUMENTED IN MEDICAL RECORD: ICD-10-PCS | Mod: HCNC,S$GLB,, | Performed by: INTERNAL MEDICINE

## 2020-08-04 PROCEDURE — 99499 RISK ADDL DX/OHS AUDIT: ICD-10-PCS | Mod: S$GLB,,, | Performed by: INTERNAL MEDICINE

## 2020-08-04 PROCEDURE — 99214 PR OFFICE/OUTPT VISIT, EST, LEVL IV, 30-39 MIN: ICD-10-PCS | Mod: HCNC,S$GLB,, | Performed by: INTERNAL MEDICINE

## 2020-08-04 PROCEDURE — 1101F PT FALLS ASSESS-DOCD LE1/YR: CPT | Mod: HCNC,CPTII,S$GLB, | Performed by: INTERNAL MEDICINE

## 2020-08-04 PROCEDURE — 99214 OFFICE O/P EST MOD 30 MIN: CPT | Mod: HCNC,S$GLB,, | Performed by: INTERNAL MEDICINE

## 2020-08-04 PROCEDURE — 1159F MED LIST DOCD IN RCRD: CPT | Mod: HCNC,S$GLB,, | Performed by: INTERNAL MEDICINE

## 2020-08-04 PROCEDURE — 99499 UNLISTED E&M SERVICE: CPT | Mod: S$GLB,,, | Performed by: INTERNAL MEDICINE

## 2020-08-04 PROCEDURE — 1101F PR PT FALLS ASSESS DOC 0-1 FALLS W/OUT INJ PAST YR: ICD-10-PCS | Mod: HCNC,CPTII,S$GLB, | Performed by: INTERNAL MEDICINE

## 2020-08-04 PROCEDURE — 99999 PR PBB SHADOW E&M-EST. PATIENT-LVL III: CPT | Mod: PBBFAC,HCNC,, | Performed by: INTERNAL MEDICINE

## 2020-08-04 RX ORDER — PRAVASTATIN SODIUM 20 MG/1
20 TABLET ORAL NIGHTLY
Qty: 90 TABLET | Refills: 3 | Status: SHIPPED | OUTPATIENT
Start: 2020-08-04 | End: 2021-09-25

## 2020-08-04 NOTE — PROGRESS NOTES
Subjective:       Patient ID: Margarita Cruz is a 89 y.o. female.    Chief Complaint: Follow-up (review lab results)    HPI  Pt here today with her daughter who is an RN at a local hospital.  Patient here for reassessment and go over her labs.     Essential hypertension:  Blood pressure at home has been averaging around 134/56; she does watch his salt intake.     Anxiety with depression:  Has been stable; on sertraline 50 mg.     Senile dementia without behavioral disturbance:  Short-term memory has been stable; Aricept was no help; she declined Exelon patch.  On Namenda.     Age related osteoporosis:  They did not desire any future DEXA scans.  Light weight-bearing exercises can help along with a vitamin-D supplementation     Vitamin-D deficiency with recent level 36; increase vitamin D3 to 2000 IU per day.     Other hyperlipidemia occasionally watches her diet; tolerates pravastatin fine.     Impaired fasting glucose:  Hemoglobin A1c 5.7 with fasting blood sugar 97.  Not necessarily watching her carbohydrates.     GERD:  No problem on omeprazole 20 mg.     Renal insufficiency:  GFR 55.7 with creatinine 0.9; advised to try and drink more fluids during the day; discussed avoiding any NSA ID agents and use Tylenol if needed for pain.     History of cold thyroid nodules not desiring workup or treatment;      Subclinical hyperthyroidism:  also with TSH reduced with normal free T4; daughter and patient wants to just watch this and not to do workup or treatment at this time.     Total time 2:50 a.m. to 3:33 p.m..  Greater than 50% of time spent in discussion, counseling, and review.  Meds reviewed and addressed.  Labs ordered for follow-up  10 min spent on supplemental documentation and review as well.    Review of Systems   Constitutional: Negative for appetite change and fever.   HENT: Negative for congestion, postnasal drip, rhinorrhea and sinus pressure.    Eyes: Negative for discharge and itching.   Respiratory:  "Negative for cough, chest tightness, shortness of breath and wheezing.    Cardiovascular: Negative for chest pain, palpitations and leg swelling.   Gastrointestinal: Negative for abdominal distention, abdominal pain, blood in stool, constipation, diarrhea, nausea and vomiting.   Endocrine: Negative for polydipsia, polyphagia and polyuria.   Genitourinary: Negative for dysuria and hematuria.   Musculoskeletal: Negative for arthralgias and myalgias.   Skin: Negative for rash.   Allergic/Immunologic: Negative for environmental allergies and food allergies.   Neurological: Negative for tremors, seizures and syncope.   Hematological: Negative for adenopathy. Does not bruise/bleed easily.   Psychiatric/Behavioral: Negative for dysphoric mood and sleep disturbance. The patient is not nervous/anxious.        Objective:      Vitals:    08/04/20 1445   BP: (!) 134/56   BP Location: Right arm   Patient Position: Sitting   BP Method: Medium (Manual)   Pulse: 74   Temp: 98.1 °F (36.7 °C)   TempSrc: Temporal   SpO2: 97%   Weight: 62.9 kg (138 lb 9 oz)   Height: 5' 2" (1.575 m)     Body mass index is 25.34 kg/m².  Wt Readings from Last 3 Encounters:   08/04/20 62.9 kg (138 lb 9 oz)   09/05/19 63.3 kg (139 lb 10.6 oz)   02/26/19 63 kg (138 lb 14.2 oz)        Physical Exam  Vitals signs reviewed.   Constitutional:       Appearance: She is well-developed.   HENT:      Head: Normocephalic and atraumatic.   Neck:      Musculoskeletal: Normal range of motion and neck supple.      Thyroid: No thyromegaly.   Cardiovascular:      Rate and Rhythm: Normal rate and regular rhythm.      Heart sounds: Normal heart sounds. No murmur. No gallop.    Pulmonary:      Effort: Pulmonary effort is normal. No respiratory distress.      Breath sounds: Normal breath sounds. No wheezing or rales.   Abdominal:      General: Bowel sounds are normal. There is no distension.      Palpations: Abdomen is soft.      Tenderness: There is no abdominal tenderness. " There is no guarding or rebound.   Musculoskeletal: Normal range of motion.   Lymphadenopathy:      Cervical: No cervical adenopathy.   Skin:     Findings: No rash.   Neurological:      Mental Status: She is alert and oriented to person, place, and time.      Comments: Moves all 4 extremities fine.   Psychiatric:         Behavior: Behavior normal.         Thought Content: Thought content normal.         Assessment:       1. Essential hypertension    2. Anxiety with depression    3. Senile dementia without behavioral disturbance    4. Impaired fasting glucose    5. Age-related osteoporosis without current pathological fracture    6. Vitamin D deficiency    7. Subclinical hyperthyroidism    8. Other hyperlipidemia        Plan:       Essential hypertension: Maintain < 2 Gm Na a day diet, and monitor BP at home; keep a log.  -     Basic metabolic panel; Future; Expected date: 08/04/2020    Anxiety with depression: stable on sertraline; light exercise as tolerated.     Senile dementia without behavioral disturbance: on namenda generic.     Impaired fasting glucose: Exercise recommended with weight reduction and low carb diet; we'll follow hemoglobin A1c's with you periodically.    Age-related osteoporosis without current pathological fracture: Weight bearing exercises, continue calcium and vit D supplements. DEXA declined for future.  -     Vitamin D; Future; Expected date: 08/04/2020  -     Calcium; Future; Expected date: 08/04/2020    Vitamin D deficiency: update levels w f/u; increase vit D3 to 2000 iu a day.   -     Vitamin D; Future; Expected date: 08/04/2020  -     Calcium; Future; Expected date: 08/04/2020    Subclinical hyperthyroidism: nl free T4 at 1.15 on 07/28/2020; follow levels no tx desired.  07/28/2020 TSH 0.194 previously 0.246.  Also reportedly with cold thyroid nodules not desiring workup.  -     T4, free; Future; Expected date: 08/04/2020  -     T3, free; Future; Expected date: 08/04/2020  -     TSH;  Future; Expected date: 08/04/2020    Other hyperlipidemia: Maintain low fat high fiber diet, exercise regularly. Weight reduction where indicated. No statin desired.   -     pravastatin (PRAVACHOL) 20 MG tablet; Take 1 tablet (20 mg total) by mouth every evening.  Dispense: 90 tablet; Refill: 3

## 2020-08-04 NOTE — PATIENT INSTRUCTIONS
Essential hypertension: Maintain < 2 Gm Na a day diet, and monitor BP at home; keep a log.  -     Basic metabolic panel; Future; Expected date: 08/04/2020    Anxiety with depression: stable on sertraline; light exercise as tolerated.     Senile dementia without behavioral disturbance: on namenda generic.     Impaired fasting glucose: Exercise recommended with weight reduction and low carb diet; we'll follow hemoglobin A1c's with you periodically.    Age-related osteoporosis without current pathological fracture: Weight bearing exercises, continue calcium and vit D supplements. DEXA declined for future.  -     Vitamin D; Future; Expected date: 08/04/2020  -     Calcium; Future; Expected date: 08/04/2020    Vitamin D deficiency: update levels w f/u; increase vit D3 to 2000 iu a day.   -     Vitamin D; Future; Expected date: 08/04/2020  -     Calcium; Future; Expected date: 08/04/2020    Subclinical hyperthyroidism: nl free T4; follow levels no tx desired.   -     T4, free; Future; Expected date: 08/04/2020  -     T3, free; Future; Expected date: 08/04/2020  -     TSH; Future; Expected date: 08/04/2020    Other hyperlipidemia: Maintain low fat high fiber diet, exercise regularly. Weight reduction where indicated. No statin desired.   -     pravastatin (PRAVACHOL) 20 MG tablet; Take 1 tablet (20 mg total) by mouth every evening.  Dispense: 90 tablet; Refill: 3

## 2020-08-15 ENCOUNTER — TELEPHONE (OUTPATIENT)
Dept: FAMILY MEDICINE | Facility: CLINIC | Age: 85
End: 2020-08-15

## 2020-08-15 NOTE — TELEPHONE ENCOUNTER
Please schedule a follow-up appointment in 6 months from her recent office visit with labs 1 week prior.  Please add a CMP and a lipid profile to her follow-up scheduled labs these have been ordered.  And please remove a BMP, and calcium level previously ordered from her scheduled labs for follow-up; thank you!

## 2020-08-15 NOTE — LETTER
August 19, 2020    Margarita Cruz  [address]  [place] [address]             [facility]  [address]  [place] [address]  Phone: [phone]  Fax: [phone] Dear Mrs. Cruz:    We have been unable to reach you by phone. Dr. Llanes would like to see you in office for follow-up in about 6 months with fasting labs completed one week prior to that appointment. We would be happy to assist you with scheduling.     If you have any questions or concerns, please don't hesitate to call.    Sincerely,        Jessica Lemon LPN

## 2020-09-29 RX ORDER — AMLODIPINE BESYLATE 5 MG/1
TABLET ORAL
Qty: 90 TABLET | Refills: 3 | Status: SHIPPED | OUTPATIENT
Start: 2020-09-29 | End: 2021-09-21

## 2020-09-29 RX ORDER — SERTRALINE HYDROCHLORIDE 50 MG/1
TABLET, FILM COATED ORAL
Qty: 90 TABLET | Refills: 3 | Status: SHIPPED | OUTPATIENT
Start: 2020-09-29 | End: 2021-11-13

## 2020-09-29 RX ORDER — HYDRALAZINE HYDROCHLORIDE 25 MG/1
TABLET, FILM COATED ORAL
Qty: 180 TABLET | Refills: 3 | Status: SHIPPED | OUTPATIENT
Start: 2020-09-29 | End: 2021-12-21

## 2021-01-26 ENCOUNTER — LAB VISIT (OUTPATIENT)
Dept: LAB | Facility: HOSPITAL | Age: 86
End: 2021-01-26
Attending: INTERNAL MEDICINE
Payer: MEDICARE

## 2021-01-26 DIAGNOSIS — E78.49 OTHER HYPERLIPIDEMIA: ICD-10-CM

## 2021-01-26 DIAGNOSIS — M81.0 AGE-RELATED OSTEOPOROSIS WITHOUT CURRENT PATHOLOGICAL FRACTURE: ICD-10-CM

## 2021-01-26 DIAGNOSIS — I10 ESSENTIAL HYPERTENSION: ICD-10-CM

## 2021-01-26 DIAGNOSIS — E05.90 SUBCLINICAL HYPERTHYROIDISM: ICD-10-CM

## 2021-01-26 DIAGNOSIS — E55.9 VITAMIN D DEFICIENCY: ICD-10-CM

## 2021-01-26 LAB
ALBUMIN SERPL BCP-MCNC: 3.8 G/DL (ref 3.5–5.2)
ALP SERPL-CCNC: 78 U/L (ref 55–135)
ALT SERPL W/O P-5'-P-CCNC: 12 U/L (ref 10–44)
ANION GAP SERPL CALC-SCNC: 8 MMOL/L (ref 8–16)
AST SERPL-CCNC: 19 U/L (ref 10–40)
BILIRUB SERPL-MCNC: 0.5 MG/DL (ref 0.1–1)
BUN SERPL-MCNC: 14 MG/DL (ref 10–30)
CALCIUM SERPL-MCNC: 9.5 MG/DL (ref 8.7–10.5)
CHLORIDE SERPL-SCNC: 109 MMOL/L (ref 95–110)
CHOLEST SERPL-MCNC: 159 MG/DL (ref 120–199)
CHOLEST/HDLC SERPL: 2.6 {RATIO} (ref 2–5)
CO2 SERPL-SCNC: 26 MMOL/L (ref 23–29)
CREAT SERPL-MCNC: 0.8 MG/DL (ref 0.5–1.4)
EST. GFR  (AFRICAN AMERICAN): >60 ML/MIN/1.73 M^2
EST. GFR  (NON AFRICAN AMERICAN): >60 ML/MIN/1.73 M^2
GLUCOSE SERPL-MCNC: 108 MG/DL (ref 70–110)
HDLC SERPL-MCNC: 61 MG/DL (ref 40–75)
HDLC SERPL: 38.4 % (ref 20–50)
LDLC SERPL CALC-MCNC: 72 MG/DL (ref 63–159)
NONHDLC SERPL-MCNC: 98 MG/DL
POTASSIUM SERPL-SCNC: 4.2 MMOL/L (ref 3.5–5.1)
PROT SERPL-MCNC: 6.9 G/DL (ref 6–8.4)
SODIUM SERPL-SCNC: 143 MMOL/L (ref 136–145)
T3FREE SERPL-MCNC: 2.9 PG/ML (ref 2.3–4.2)
T4 FREE SERPL-MCNC: 1.14 NG/DL (ref 0.71–1.51)
TRIGL SERPL-MCNC: 130 MG/DL (ref 30–150)
TSH SERPL DL<=0.005 MIU/L-ACNC: 0.09 UIU/ML (ref 0.4–4)

## 2021-01-26 PROCEDURE — 36415 COLL VENOUS BLD VENIPUNCTURE: CPT | Mod: PN

## 2021-01-26 PROCEDURE — 84443 ASSAY THYROID STIM HORMONE: CPT

## 2021-01-26 PROCEDURE — 80061 LIPID PANEL: CPT

## 2021-01-26 PROCEDURE — 82306 VITAMIN D 25 HYDROXY: CPT

## 2021-01-26 PROCEDURE — 84439 ASSAY OF FREE THYROXINE: CPT

## 2021-01-26 PROCEDURE — 84481 FREE ASSAY (FT-3): CPT

## 2021-01-26 PROCEDURE — 80053 COMPREHEN METABOLIC PANEL: CPT

## 2021-01-27 LAB — 25(OH)D3+25(OH)D2 SERPL-MCNC: 33 NG/ML (ref 30–96)

## 2021-02-02 ENCOUNTER — LAB VISIT (OUTPATIENT)
Dept: LAB | Facility: HOSPITAL | Age: 86
End: 2021-02-02
Attending: INTERNAL MEDICINE
Payer: MEDICARE

## 2021-02-02 ENCOUNTER — OFFICE VISIT (OUTPATIENT)
Dept: FAMILY MEDICINE | Facility: CLINIC | Age: 86
End: 2021-02-02
Payer: MEDICARE

## 2021-02-02 VITALS
HEART RATE: 78 BPM | BODY MASS INDEX: 24.24 KG/M2 | HEIGHT: 62 IN | SYSTOLIC BLOOD PRESSURE: 146 MMHG | OXYGEN SATURATION: 98 % | WEIGHT: 131.75 LBS | DIASTOLIC BLOOD PRESSURE: 52 MMHG

## 2021-02-02 DIAGNOSIS — F03.90 SENILE DEMENTIA WITHOUT BEHAVIORAL DISTURBANCE: ICD-10-CM

## 2021-02-02 DIAGNOSIS — E05.90 HYPERTHYROIDISM: ICD-10-CM

## 2021-02-02 DIAGNOSIS — F41.8 ANXIETY WITH DEPRESSION: ICD-10-CM

## 2021-02-02 DIAGNOSIS — R73.01 IMPAIRED FASTING GLUCOSE: ICD-10-CM

## 2021-02-02 DIAGNOSIS — I10 ESSENTIAL HYPERTENSION: Primary | ICD-10-CM

## 2021-02-02 DIAGNOSIS — E78.49 OTHER HYPERLIPIDEMIA: ICD-10-CM

## 2021-02-02 DIAGNOSIS — M81.0 AGE-RELATED OSTEOPOROSIS WITHOUT CURRENT PATHOLOGICAL FRACTURE: ICD-10-CM

## 2021-02-02 DIAGNOSIS — E55.9 VITAMIN D DEFICIENCY: ICD-10-CM

## 2021-02-02 DIAGNOSIS — R41.89 COGNITIVE IMPAIRMENT: ICD-10-CM

## 2021-02-02 PROCEDURE — 86376 MICROSOMAL ANTIBODY EACH: CPT

## 2021-02-02 PROCEDURE — 99214 PR OFFICE/OUTPT VISIT, EST, LEVL IV, 30-39 MIN: ICD-10-PCS | Mod: S$GLB,,, | Performed by: INTERNAL MEDICINE

## 2021-02-02 PROCEDURE — 1101F PT FALLS ASSESS-DOCD LE1/YR: CPT | Mod: CPTII,S$GLB,, | Performed by: INTERNAL MEDICINE

## 2021-02-02 PROCEDURE — 84445 ASSAY OF TSI GLOBULIN: CPT

## 2021-02-02 PROCEDURE — 1159F MED LIST DOCD IN RCRD: CPT | Mod: S$GLB,,, | Performed by: INTERNAL MEDICINE

## 2021-02-02 PROCEDURE — 99499 RISK ADDL DX/OHS AUDIT: ICD-10-PCS | Mod: HCNC,S$GLB,, | Performed by: INTERNAL MEDICINE

## 2021-02-02 PROCEDURE — 99499 UNLISTED E&M SERVICE: CPT | Mod: HCNC,S$GLB,, | Performed by: INTERNAL MEDICINE

## 2021-02-02 PROCEDURE — 1159F PR MEDICATION LIST DOCUMENTED IN MEDICAL RECORD: ICD-10-PCS | Mod: S$GLB,,, | Performed by: INTERNAL MEDICINE

## 2021-02-02 PROCEDURE — 3288F PR FALLS RISK ASSESSMENT DOCUMENTED: ICD-10-PCS | Mod: CPTII,S$GLB,, | Performed by: INTERNAL MEDICINE

## 2021-02-02 PROCEDURE — 3288F FALL RISK ASSESSMENT DOCD: CPT | Mod: CPTII,S$GLB,, | Performed by: INTERNAL MEDICINE

## 2021-02-02 PROCEDURE — 36415 COLL VENOUS BLD VENIPUNCTURE: CPT | Mod: PN

## 2021-02-02 PROCEDURE — 1101F PR PT FALLS ASSESS DOC 0-1 FALLS W/OUT INJ PAST YR: ICD-10-PCS | Mod: CPTII,S$GLB,, | Performed by: INTERNAL MEDICINE

## 2021-02-02 PROCEDURE — 99214 OFFICE O/P EST MOD 30 MIN: CPT | Mod: S$GLB,,, | Performed by: INTERNAL MEDICINE

## 2021-02-02 PROCEDURE — 99999 PR PBB SHADOW E&M-EST. PATIENT-LVL IV: ICD-10-PCS | Mod: PBBFAC,,, | Performed by: INTERNAL MEDICINE

## 2021-02-02 PROCEDURE — 99999 PR PBB SHADOW E&M-EST. PATIENT-LVL IV: CPT | Mod: PBBFAC,,, | Performed by: INTERNAL MEDICINE

## 2021-02-03 LAB — THYROPEROXIDASE IGG SERPL-ACNC: <6 IU/ML

## 2021-02-04 LAB — TSI SER-ACNC: <0.1 IU/L

## 2021-04-23 ENCOUNTER — PES CALL (OUTPATIENT)
Dept: ADMINISTRATIVE | Facility: CLINIC | Age: 86
End: 2021-04-23

## 2021-09-23 DIAGNOSIS — I10 ESSENTIAL HYPERTENSION: ICD-10-CM

## 2021-09-23 DIAGNOSIS — R73.01 IMPAIRED FASTING GLUCOSE: ICD-10-CM

## 2021-09-23 DIAGNOSIS — E55.9 VITAMIN D DEFICIENCY: ICD-10-CM

## 2021-09-23 DIAGNOSIS — E78.49 OTHER HYPERLIPIDEMIA: ICD-10-CM

## 2021-09-23 DIAGNOSIS — E05.90 HYPERTHYROIDISM: Primary | ICD-10-CM

## 2021-09-25 RX ORDER — PRAVASTATIN SODIUM 20 MG/1
TABLET ORAL
Qty: 90 TABLET | Refills: 0 | Status: SHIPPED | OUTPATIENT
Start: 2021-09-25 | End: 2021-12-30

## 2021-09-27 ENCOUNTER — TELEPHONE (OUTPATIENT)
Dept: FAMILY MEDICINE | Facility: CLINIC | Age: 86
End: 2021-09-27
Payer: MEDICARE

## 2021-10-01 ENCOUNTER — LAB VISIT (OUTPATIENT)
Dept: LAB | Facility: HOSPITAL | Age: 86
End: 2021-10-01
Attending: INTERNAL MEDICINE
Payer: MEDICARE

## 2021-10-01 DIAGNOSIS — I10 ESSENTIAL HYPERTENSION: ICD-10-CM

## 2021-10-01 DIAGNOSIS — R73.01 IMPAIRED FASTING GLUCOSE: ICD-10-CM

## 2021-10-01 DIAGNOSIS — E78.49 OTHER HYPERLIPIDEMIA: ICD-10-CM

## 2021-10-01 DIAGNOSIS — E05.90 HYPERTHYROIDISM: ICD-10-CM

## 2021-10-01 DIAGNOSIS — E55.9 VITAMIN D DEFICIENCY: ICD-10-CM

## 2021-10-01 LAB
25(OH)D3+25(OH)D2 SERPL-MCNC: 49 NG/ML (ref 30–96)
ALBUMIN SERPL BCP-MCNC: 3.8 G/DL (ref 3.5–5.2)
ALP SERPL-CCNC: 71 U/L (ref 55–135)
ALT SERPL W/O P-5'-P-CCNC: 14 U/L (ref 10–44)
ANION GAP SERPL CALC-SCNC: 14 MMOL/L (ref 8–16)
AST SERPL-CCNC: 21 U/L (ref 10–40)
BASOPHILS # BLD AUTO: 0.07 K/UL (ref 0–0.2)
BASOPHILS NFR BLD: 1 % (ref 0–1.9)
BILIRUB SERPL-MCNC: 0.7 MG/DL (ref 0.1–1)
BUN SERPL-MCNC: 15 MG/DL (ref 10–30)
CALCIUM SERPL-MCNC: 10.3 MG/DL (ref 8.7–10.5)
CHLORIDE SERPL-SCNC: 106 MMOL/L (ref 95–110)
CHOLEST SERPL-MCNC: 178 MG/DL (ref 120–199)
CHOLEST/HDLC SERPL: 2.6 {RATIO} (ref 2–5)
CO2 SERPL-SCNC: 21 MMOL/L (ref 23–29)
CREAT SERPL-MCNC: 0.8 MG/DL (ref 0.5–1.4)
DIFFERENTIAL METHOD: ABNORMAL
EOSINOPHIL # BLD AUTO: 0.2 K/UL (ref 0–0.5)
EOSINOPHIL NFR BLD: 2.7 % (ref 0–8)
ERYTHROCYTE [DISTWIDTH] IN BLOOD BY AUTOMATED COUNT: 14.7 % (ref 11.5–14.5)
EST. GFR  (AFRICAN AMERICAN): >60 ML/MIN/1.73 M^2
EST. GFR  (NON AFRICAN AMERICAN): >60 ML/MIN/1.73 M^2
ESTIMATED AVG GLUCOSE: 114 MG/DL (ref 68–131)
GLUCOSE SERPL-MCNC: 111 MG/DL (ref 70–110)
HBA1C MFR BLD: 5.6 % (ref 4–5.6)
HCT VFR BLD AUTO: 39.7 % (ref 37–48.5)
HDLC SERPL-MCNC: 68 MG/DL (ref 40–75)
HDLC SERPL: 38.2 % (ref 20–50)
HGB BLD-MCNC: 12.8 G/DL (ref 12–16)
IMM GRANULOCYTES # BLD AUTO: 0.02 K/UL (ref 0–0.04)
IMM GRANULOCYTES NFR BLD AUTO: 0.3 % (ref 0–0.5)
LDLC SERPL CALC-MCNC: 89.2 MG/DL (ref 63–159)
LYMPHOCYTES # BLD AUTO: 2.4 K/UL (ref 1–4.8)
LYMPHOCYTES NFR BLD: 34.5 % (ref 18–48)
MCH RBC QN AUTO: 28.3 PG (ref 27–31)
MCHC RBC AUTO-ENTMCNC: 32.2 G/DL (ref 32–36)
MCV RBC AUTO: 88 FL (ref 82–98)
MONOCYTES # BLD AUTO: 0.5 K/UL (ref 0.3–1)
MONOCYTES NFR BLD: 7.5 % (ref 4–15)
NEUTROPHILS # BLD AUTO: 3.8 K/UL (ref 1.8–7.7)
NEUTROPHILS NFR BLD: 54 % (ref 38–73)
NONHDLC SERPL-MCNC: 110 MG/DL
NRBC BLD-RTO: 0 /100 WBC
PLATELET # BLD AUTO: 299 K/UL (ref 150–450)
PMV BLD AUTO: 11 FL (ref 9.2–12.9)
POTASSIUM SERPL-SCNC: 4.2 MMOL/L (ref 3.5–5.1)
PROT SERPL-MCNC: 7 G/DL (ref 6–8.4)
RBC # BLD AUTO: 4.52 M/UL (ref 4–5.4)
SODIUM SERPL-SCNC: 141 MMOL/L (ref 136–145)
T3FREE SERPL-MCNC: 3.1 PG/ML (ref 2.3–4.2)
T4 FREE SERPL-MCNC: 1.06 NG/DL (ref 0.71–1.51)
TRIGL SERPL-MCNC: 104 MG/DL (ref 30–150)
TSH SERPL DL<=0.005 MIU/L-ACNC: 0.34 UIU/ML (ref 0.4–4)
WBC # BLD AUTO: 7.04 K/UL (ref 3.9–12.7)

## 2021-10-01 PROCEDURE — 80053 COMPREHEN METABOLIC PANEL: CPT | Mod: HCNC | Performed by: INTERNAL MEDICINE

## 2021-10-01 PROCEDURE — 82306 VITAMIN D 25 HYDROXY: CPT | Mod: HCNC | Performed by: INTERNAL MEDICINE

## 2021-10-01 PROCEDURE — 83036 HEMOGLOBIN GLYCOSYLATED A1C: CPT | Mod: HCNC | Performed by: INTERNAL MEDICINE

## 2021-10-01 PROCEDURE — 80061 LIPID PANEL: CPT | Mod: HCNC | Performed by: INTERNAL MEDICINE

## 2021-10-01 PROCEDURE — 84439 ASSAY OF FREE THYROXINE: CPT | Mod: HCNC | Performed by: INTERNAL MEDICINE

## 2021-10-01 PROCEDURE — 84481 FREE ASSAY (FT-3): CPT | Mod: HCNC | Performed by: INTERNAL MEDICINE

## 2021-10-01 PROCEDURE — 36415 COLL VENOUS BLD VENIPUNCTURE: CPT | Mod: HCNC,PN | Performed by: INTERNAL MEDICINE

## 2021-10-01 PROCEDURE — 85025 COMPLETE CBC W/AUTO DIFF WBC: CPT | Mod: HCNC | Performed by: INTERNAL MEDICINE

## 2021-10-01 PROCEDURE — 84443 ASSAY THYROID STIM HORMONE: CPT | Mod: HCNC | Performed by: INTERNAL MEDICINE

## 2021-10-07 ENCOUNTER — OFFICE VISIT (OUTPATIENT)
Dept: FAMILY MEDICINE | Facility: CLINIC | Age: 86
End: 2021-10-07
Payer: MEDICARE

## 2021-10-07 VITALS
HEIGHT: 62 IN | HEART RATE: 78 BPM | BODY MASS INDEX: 22.76 KG/M2 | SYSTOLIC BLOOD PRESSURE: 146 MMHG | OXYGEN SATURATION: 95 % | WEIGHT: 123.69 LBS | DIASTOLIC BLOOD PRESSURE: 60 MMHG

## 2021-10-07 DIAGNOSIS — E78.49 OTHER HYPERLIPIDEMIA: ICD-10-CM

## 2021-10-07 DIAGNOSIS — I10 PRIMARY HYPERTENSION: Primary | ICD-10-CM

## 2021-10-07 DIAGNOSIS — Z01.89 ENCOUNTER FOR LABORATORY TEST: ICD-10-CM

## 2021-10-07 DIAGNOSIS — F41.8 ANXIETY WITH DEPRESSION: ICD-10-CM

## 2021-10-07 DIAGNOSIS — F03.90 SENILE DEMENTIA WITHOUT BEHAVIORAL DISTURBANCE: ICD-10-CM

## 2021-10-07 DIAGNOSIS — E05.90 HYPERTHYROIDISM: ICD-10-CM

## 2021-10-07 DIAGNOSIS — R73.01 IMPAIRED FASTING GLUCOSE: ICD-10-CM

## 2021-10-07 DIAGNOSIS — F41.8 SITUATIONAL ANXIETY: ICD-10-CM

## 2021-10-07 PROCEDURE — 3288F PR FALLS RISK ASSESSMENT DOCUMENTED: ICD-10-PCS | Mod: HCNC,CPTII,S$GLB, | Performed by: INTERNAL MEDICINE

## 2021-10-07 PROCEDURE — 99214 OFFICE O/P EST MOD 30 MIN: CPT | Mod: HCNC,S$GLB,, | Performed by: INTERNAL MEDICINE

## 2021-10-07 PROCEDURE — 99499 RISK ADDL DX/OHS AUDIT: ICD-10-PCS | Mod: HCNC,S$GLB,, | Performed by: INTERNAL MEDICINE

## 2021-10-07 PROCEDURE — 99214 PR OFFICE/OUTPT VISIT, EST, LEVL IV, 30-39 MIN: ICD-10-PCS | Mod: HCNC,S$GLB,, | Performed by: INTERNAL MEDICINE

## 2021-10-07 PROCEDURE — 1159F MED LIST DOCD IN RCRD: CPT | Mod: HCNC,CPTII,S$GLB, | Performed by: INTERNAL MEDICINE

## 2021-10-07 PROCEDURE — 99499 UNLISTED E&M SERVICE: CPT | Mod: HCNC,S$GLB,, | Performed by: INTERNAL MEDICINE

## 2021-10-07 PROCEDURE — 99999 PR PBB SHADOW E&M-EST. PATIENT-LVL III: CPT | Mod: PBBFAC,HCNC,, | Performed by: INTERNAL MEDICINE

## 2021-10-07 PROCEDURE — 1101F PR PT FALLS ASSESS DOC 0-1 FALLS W/OUT INJ PAST YR: ICD-10-PCS | Mod: HCNC,CPTII,S$GLB, | Performed by: INTERNAL MEDICINE

## 2021-10-07 PROCEDURE — 3288F FALL RISK ASSESSMENT DOCD: CPT | Mod: HCNC,CPTII,S$GLB, | Performed by: INTERNAL MEDICINE

## 2021-10-07 PROCEDURE — 1159F PR MEDICATION LIST DOCUMENTED IN MEDICAL RECORD: ICD-10-PCS | Mod: HCNC,CPTII,S$GLB, | Performed by: INTERNAL MEDICINE

## 2021-10-07 PROCEDURE — 1101F PT FALLS ASSESS-DOCD LE1/YR: CPT | Mod: HCNC,CPTII,S$GLB, | Performed by: INTERNAL MEDICINE

## 2021-10-07 PROCEDURE — 99999 PR PBB SHADOW E&M-EST. PATIENT-LVL III: ICD-10-PCS | Mod: PBBFAC,HCNC,, | Performed by: INTERNAL MEDICINE

## 2021-10-07 RX ORDER — BUSPIRONE HYDROCHLORIDE 5 MG/1
TABLET ORAL
Qty: 50 TABLET | Refills: 1 | Status: SHIPPED | OUTPATIENT
Start: 2021-10-07 | End: 2024-03-14

## 2021-11-13 RX ORDER — SERTRALINE HYDROCHLORIDE 50 MG/1
TABLET, FILM COATED ORAL
Qty: 90 TABLET | Refills: 1 | Status: SHIPPED | OUTPATIENT
Start: 2021-11-13 | End: 2022-07-20

## 2021-12-16 RX ORDER — AMLODIPINE BESYLATE 5 MG/1
TABLET ORAL
Qty: 90 TABLET | Refills: 1 | Status: SHIPPED | OUTPATIENT
Start: 2021-12-16 | End: 2022-07-04

## 2021-12-21 RX ORDER — HYDRALAZINE HYDROCHLORIDE 25 MG/1
TABLET, FILM COATED ORAL
Qty: 180 TABLET | Refills: 3 | Status: SHIPPED | OUTPATIENT
Start: 2021-12-21 | End: 2023-01-20 | Stop reason: SDUPTHER

## 2021-12-30 DIAGNOSIS — E78.49 OTHER HYPERLIPIDEMIA: ICD-10-CM

## 2021-12-30 RX ORDER — PRAVASTATIN SODIUM 20 MG/1
TABLET ORAL
Qty: 90 TABLET | Refills: 1 | Status: SHIPPED | OUTPATIENT
Start: 2021-12-30 | End: 2022-07-04

## 2022-01-10 ENCOUNTER — TELEPHONE (OUTPATIENT)
Dept: FAMILY MEDICINE | Facility: CLINIC | Age: 87
End: 2022-01-10
Payer: MEDICARE

## 2022-01-10 NOTE — TELEPHONE ENCOUNTER
----- Message from Ana Sims sent at 1/10/2022  8:01 AM CST -----  .Type: Needs Lab orders  Who Called:  Pt  Best Call Back Number: [phone]  Additional Information: Requesting lab orders before follow up appt.   Please advise  Thanks

## 2022-01-20 ENCOUNTER — TELEPHONE (OUTPATIENT)
Dept: FAMILY MEDICINE | Facility: CLINIC | Age: 87
End: 2022-01-20

## 2022-01-20 NOTE — TELEPHONE ENCOUNTER
----- Message from Haile Jimenez sent at 1/20/2022 11:13 AM CST -----  Type: Needs Medical Advice  Who Called:  Cristin Valdes (Daughter)    Best Call Back Number:[phone]  Additional Information:Caller states that she would like a callback regarding the patient needing lab orders prior to her appointment.

## 2022-01-20 NOTE — TELEPHONE ENCOUNTER
Pt has appt 02/17/2022  Asking for lab orders prior. Please review pended labs and advise of any additional needed at this time.

## 2022-01-25 ENCOUNTER — TELEPHONE (OUTPATIENT)
Dept: FAMILY MEDICINE | Facility: CLINIC | Age: 87
End: 2022-01-25
Payer: MEDICARE

## 2022-01-25 NOTE — TELEPHONE ENCOUNTER
----- Message from Sanjuanita Garsia sent at 1/25/2022  2:57 PM CST -----  Regarding: soon appt due to fall  Type:  Sooner Apoointment Request    Caller is requesting a sooner appointment.  Caller declined first available appointment listed below.  Caller will not accept being placed on the waitlist and is requesting a message be sent to doctor.    Name of Caller: juan daughter  When is the first available appointment?  02/17  Symptoms:  due to a fall  Best Call Back Number: [phone] (home)     Additional Information: pt seen in the ED on 01/17

## 2022-02-10 ENCOUNTER — TELEPHONE (OUTPATIENT)
Dept: FAMILY MEDICINE | Facility: CLINIC | Age: 87
End: 2022-02-10
Payer: MEDICARE

## 2022-02-10 DIAGNOSIS — I10 PRIMARY HYPERTENSION: ICD-10-CM

## 2022-02-10 DIAGNOSIS — E78.49 OTHER HYPERLIPIDEMIA: Primary | ICD-10-CM

## 2022-02-10 NOTE — TELEPHONE ENCOUNTER
Spoke with pt's daughter. Lipid and CMP ordered per wog. Informed pt daughter if any other additional labs are needed Dr. Llanes will order after seeing pt at appt on 2/17/22. Verbalized understanding. Lab appt scheduled for 2/15/22.

## 2022-02-10 NOTE — TELEPHONE ENCOUNTER
----- Message from Ludwig Beach sent at 2/10/2022  3:03 PM CST -----  Contact: Cristindereck Valdes (Daughter)  Type: Patient Call Back    Who called:Cristin Valdes (Daughter)    What is the request in detail: The patient needs orders for blood work     Can the clinic reply by [facility]?    Would the patient rather a call back or a response via [facility]?     Best call back number: [phone] (mobile)    Additional Information:

## 2022-02-15 ENCOUNTER — LAB VISIT (OUTPATIENT)
Dept: LAB | Facility: HOSPITAL | Age: 87
End: 2022-02-15
Attending: INTERNAL MEDICINE
Payer: MEDICARE

## 2022-02-15 DIAGNOSIS — E78.49 OTHER HYPERLIPIDEMIA: ICD-10-CM

## 2022-02-15 DIAGNOSIS — I10 PRIMARY HYPERTENSION: ICD-10-CM

## 2022-02-15 LAB
ALBUMIN SERPL BCP-MCNC: 3.5 G/DL (ref 3.5–5.2)
ALP SERPL-CCNC: 123 U/L (ref 55–135)
ALT SERPL W/O P-5'-P-CCNC: 12 U/L (ref 10–44)
ANION GAP SERPL CALC-SCNC: 11 MMOL/L (ref 8–16)
AST SERPL-CCNC: 22 U/L (ref 10–40)
BILIRUB SERPL-MCNC: 0.7 MG/DL (ref 0.1–1)
BUN SERPL-MCNC: 12 MG/DL (ref 10–30)
CALCIUM SERPL-MCNC: 10.1 MG/DL (ref 8.7–10.5)
CHLORIDE SERPL-SCNC: 103 MMOL/L (ref 95–110)
CHOLEST SERPL-MCNC: 165 MG/DL (ref 120–199)
CHOLEST/HDLC SERPL: 2.6 {RATIO} (ref 2–5)
CO2 SERPL-SCNC: 26 MMOL/L (ref 23–29)
CREAT SERPL-MCNC: 0.9 MG/DL (ref 0.5–1.4)
EST. GFR  (AFRICAN AMERICAN): >60 ML/MIN/1.73 M^2
EST. GFR  (NON AFRICAN AMERICAN): 54.9 ML/MIN/1.73 M^2
GLUCOSE SERPL-MCNC: 115 MG/DL (ref 70–110)
HDLC SERPL-MCNC: 64 MG/DL (ref 40–75)
HDLC SERPL: 38.8 % (ref 20–50)
LDLC SERPL CALC-MCNC: 79.8 MG/DL (ref 63–159)
NONHDLC SERPL-MCNC: 101 MG/DL
POTASSIUM SERPL-SCNC: 4.2 MMOL/L (ref 3.5–5.1)
PROT SERPL-MCNC: 7.1 G/DL (ref 6–8.4)
SODIUM SERPL-SCNC: 140 MMOL/L (ref 136–145)
TRIGL SERPL-MCNC: 106 MG/DL (ref 30–150)

## 2022-02-15 PROCEDURE — 80053 COMPREHEN METABOLIC PANEL: CPT | Mod: HCNC | Performed by: INTERNAL MEDICINE

## 2022-02-15 PROCEDURE — 80061 LIPID PANEL: CPT | Mod: HCNC | Performed by: INTERNAL MEDICINE

## 2022-02-15 PROCEDURE — 36415 COLL VENOUS BLD VENIPUNCTURE: CPT | Mod: HCNC,PN | Performed by: INTERNAL MEDICINE

## 2022-02-17 ENCOUNTER — OFFICE VISIT (OUTPATIENT)
Dept: FAMILY MEDICINE | Facility: CLINIC | Age: 87
End: 2022-02-17
Payer: MEDICARE

## 2022-02-17 VITALS
HEIGHT: 61 IN | WEIGHT: 118.69 LBS | SYSTOLIC BLOOD PRESSURE: 140 MMHG | OXYGEN SATURATION: 97 % | DIASTOLIC BLOOD PRESSURE: 60 MMHG | BODY MASS INDEX: 22.41 KG/M2 | HEART RATE: 83 BPM

## 2022-02-17 DIAGNOSIS — Z01.89 ENCOUNTER FOR LABORATORY TEST: ICD-10-CM

## 2022-02-17 DIAGNOSIS — K21.9 GASTROESOPHAGEAL REFLUX DISEASE, UNSPECIFIED WHETHER ESOPHAGITIS PRESENT: ICD-10-CM

## 2022-02-17 DIAGNOSIS — E78.49 OTHER HYPERLIPIDEMIA: ICD-10-CM

## 2022-02-17 DIAGNOSIS — I10 PRIMARY HYPERTENSION: Primary | ICD-10-CM

## 2022-02-17 DIAGNOSIS — N28.9 RENAL INSUFFICIENCY: ICD-10-CM

## 2022-02-17 DIAGNOSIS — F03.90 SENILE DEMENTIA WITHOUT BEHAVIORAL DISTURBANCE: ICD-10-CM

## 2022-02-17 DIAGNOSIS — F43.21 GRIEF AT LOSS OF CHILD: ICD-10-CM

## 2022-02-17 DIAGNOSIS — F41.8 ANXIETY WITH DEPRESSION: ICD-10-CM

## 2022-02-17 DIAGNOSIS — Z63.4 GRIEF AT LOSS OF CHILD: ICD-10-CM

## 2022-02-17 DIAGNOSIS — E55.9 VITAMIN D DEFICIENCY: ICD-10-CM

## 2022-02-17 DIAGNOSIS — R73.01 IMPAIRED FASTING GLUCOSE: ICD-10-CM

## 2022-02-17 PROCEDURE — 99214 OFFICE O/P EST MOD 30 MIN: CPT | Mod: HCNC,S$GLB,, | Performed by: INTERNAL MEDICINE

## 2022-02-17 PROCEDURE — 1159F MED LIST DOCD IN RCRD: CPT | Mod: HCNC,CPTII,S$GLB, | Performed by: INTERNAL MEDICINE

## 2022-02-17 PROCEDURE — 1100F PTFALLS ASSESS-DOCD GE2>/YR: CPT | Mod: HCNC,CPTII,S$GLB, | Performed by: INTERNAL MEDICINE

## 2022-02-17 PROCEDURE — 99499 RISK ADDL DX/OHS AUDIT: ICD-10-PCS | Mod: S$GLB,,, | Performed by: INTERNAL MEDICINE

## 2022-02-17 PROCEDURE — 99999 PR PBB SHADOW E&M-EST. PATIENT-LVL III: CPT | Mod: PBBFAC,HCNC,, | Performed by: INTERNAL MEDICINE

## 2022-02-17 PROCEDURE — 1100F PR PT FALLS ASSESS DOC 2+ FALLS/FALL W/INJURY/YR: ICD-10-PCS | Mod: HCNC,CPTII,S$GLB, | Performed by: INTERNAL MEDICINE

## 2022-02-17 PROCEDURE — 99214 PR OFFICE/OUTPT VISIT, EST, LEVL IV, 30-39 MIN: ICD-10-PCS | Mod: HCNC,S$GLB,, | Performed by: INTERNAL MEDICINE

## 2022-02-17 PROCEDURE — 1159F PR MEDICATION LIST DOCUMENTED IN MEDICAL RECORD: ICD-10-PCS | Mod: HCNC,CPTII,S$GLB, | Performed by: INTERNAL MEDICINE

## 2022-02-17 PROCEDURE — 1125F PR PAIN SEVERITY QUANTIFIED, PAIN PRESENT: ICD-10-PCS | Mod: HCNC,CPTII,S$GLB, | Performed by: INTERNAL MEDICINE

## 2022-02-17 PROCEDURE — 1160F RVW MEDS BY RX/DR IN RCRD: CPT | Mod: HCNC,CPTII,S$GLB, | Performed by: INTERNAL MEDICINE

## 2022-02-17 PROCEDURE — 3288F FALL RISK ASSESSMENT DOCD: CPT | Mod: HCNC,CPTII,S$GLB, | Performed by: INTERNAL MEDICINE

## 2022-02-17 PROCEDURE — 1125F AMNT PAIN NOTED PAIN PRSNT: CPT | Mod: HCNC,CPTII,S$GLB, | Performed by: INTERNAL MEDICINE

## 2022-02-17 PROCEDURE — 99499 UNLISTED E&M SERVICE: CPT | Mod: S$GLB,,, | Performed by: INTERNAL MEDICINE

## 2022-02-17 PROCEDURE — 1160F PR REVIEW ALL MEDS BY PRESCRIBER/CLIN PHARMACIST DOCUMENTED: ICD-10-PCS | Mod: HCNC,CPTII,S$GLB, | Performed by: INTERNAL MEDICINE

## 2022-02-17 PROCEDURE — 3288F PR FALLS RISK ASSESSMENT DOCUMENTED: ICD-10-PCS | Mod: HCNC,CPTII,S$GLB, | Performed by: INTERNAL MEDICINE

## 2022-02-17 PROCEDURE — 99999 PR PBB SHADOW E&M-EST. PATIENT-LVL III: ICD-10-PCS | Mod: PBBFAC,HCNC,, | Performed by: INTERNAL MEDICINE

## 2022-02-17 SDOH — SOCIAL DETERMINANTS OF HEALTH (SDOH): DISSAPEARANCE AND DEATH OF FAMILY MEMBER: Z63.4

## 2022-02-17 NOTE — PROGRESS NOTES
Subjective:       Patient ID: Margarita Cruz is a 89 y.o. female.    Chief Complaint: Follow-up (Lab results)    HPI:  Here today with her daughter for reassessment and go over lab results as well.  Primary hypertension: Maintain < 2 Gm Na a day diet, and monitor BP at home; keep a log for office review.  Wait 4-5 minutes after being seated before pressing the blood pressure cuff.  Blood pressure today manually is 140/60 with pulse 83.    -     CBC Auto Differential; Future; Expected date: 02/17/2022  -     Comprehensive Metabolic Panel; Future; Expected date: 02/17/2022  -     Lipid Panel; Future; Expected date: 02/17/2022  Anxiety with depression/grief at lost of a child::  Patient appears to be handling things okay; her daughter passed away recently with pancreatic cancer.  Have been prescribed BuSpar in the past to use for her anxiety as needed.  Other hyperlipidemia: Maintain low fat high fiber diet, exercise regularly. Weight reduction where indicated.  -     Comprehensive Metabolic Panel; Future; Expected date: 02/17/2022  -     Lipid Panel; Future; Expected date: 02/17/2022  Senile dementia without behavioral disturbance; not on any meds; no help prior w memantine  Vitamin D deficiency  Impaired fasting glucose.Exercise recommended with weight reduction and low carb diet; we'll follow hemoglobin A1c's with you periodically.  -     Comprehensive Metabolic Panel; Future; Expected date: 02/17/2022  Gastroesophageal reflux disease, unspecified whether esophagitis prese; No bedtime snacks; weight reduction.  Renal insufficiency; push fluids w minimum of 6 glasses a day. Limit caffeine.  GFR minimally reduced at 54.9.  Advised to use Tylenol for pain and no NSA ID agents.  -     Comprehensive Metabolic Panel; Future; Expected date: 02/17/2022  Encounter for laboratory test; reviewed w pt and daughter.  Labs ordered for follow-up  Total time 1:56 p.m. through 2:36 p.m..  Greater than 50% of time spent in  "discussion, counseling, and review.  Labs discussed and reviewed with patient and her daughter; and ordered for follow-up.  Meds also reviewed and addressed.  Various different diagnosis is were addressed as well as plan of care.        Review of Systems   Constitutional: Negative for appetite change and fever.   HENT: Negative for congestion, postnasal drip, rhinorrhea and sinus pressure.    Eyes: Negative for discharge and itching.   Respiratory: Negative for cough, chest tightness, shortness of breath and wheezing.    Cardiovascular: Negative for chest pain, palpitations and leg swelling.   Gastrointestinal: Negative for abdominal distention, abdominal pain, blood in stool, constipation, diarrhea, nausea and vomiting.   Endocrine: Negative for polydipsia, polyphagia and polyuria.   Genitourinary: Negative for dysuria and hematuria.   Musculoskeletal: Negative for arthralgias and myalgias.   Skin: Negative for rash.   Allergic/Immunologic: Negative for environmental allergies and food allergies.   Neurological: Negative for tremors, seizures and syncope.   Hematological: Negative for adenopathy. Does not bruise/bleed easily.   Psychiatric/Behavioral: Negative for dysphoric mood. The patient is not nervous/anxious.       Objective:        Vitals:    02/17/22 1323   BP: (!) 140/60   Pulse: 83   SpO2: 97%   Weight: 53.8 kg (118 lb 11.5 oz)   Height: 5' 1" (1.549 m)       BMI Readings from Last 3 Encounters:   02/17/22 22.43 kg/m²   01/12/22 23.47 kg/m²   10/07/21 22.62 kg/m²        Wt Readings from Last 3 Encounters:   02/17/22 1323 53.8 kg (118 lb 11.5 oz)   01/12/22 1558 54.5 kg (120 lb 2.4 oz)   10/07/21 1343 56.1 kg (123 lb 10.9 oz)        BP Readings from Last 3 Encounters:   02/17/22 (!) 140/60   01/12/22 (!) 173/81   10/07/21 (!) 146/60        There are no preventive care reminders to display for this patient.     Health Maintenance Due   Topic Date Due    TETANUS VACCINE  Never done    Shingles Vaccine (1 " of 2) Never done    Pneumococcal Vaccines (Age 65+) (1 of 1 - PPSV23) Never done    COVID-19 Vaccine (3 - Booster for Pfizer series) 07/17/2021    Influenza Vaccine (1) 09/01/2021         Past Medical History:   Diagnosis Date    Anxiety     Depression     Hypertension     Hypothyroidism     Iron deficiency anemia     due to blood loss    Osteoporosis     PUD (peptic ulcer disease) 11/2016    required 2 units pRBC's due to bleeding; [facility]; GI/Dr Jamison.    Pure hypercholesterolemia     Thyroid disease     Vitamin D deficiency        Past Surgical History:   Procedure Laterality Date    APPENDECTOMY      HYSTERECTOMY      thryroid         Social History     Tobacco Use    Smoking status: Former Smoker    Smokeless tobacco: Never Used       No family history on file.    Review of patient's allergies indicates:  No Known Allergies    Current Outpatient Medications on File Prior to Visit   Medication Sig Dispense Refill    amLODIPine (NORVASC) 5 MG tablet TAKE 1 TABLET BY MOUTH EVERY DAY 90 tablet 1    busPIRone (BUSPAR) 5 MG Tab 5 mg po TID as needed for anxiety 50 tablet 1    calcium carbonate (OS-MARGARITA) 600 mg (1,500 mg) Tab Take 600 mg by mouth 2 (two) times daily with meals.      diphenhydrAMINE-acetaminophen (TYLENOL PM)  mg Tab Take 1 tablet by mouth nightly as needed.      hydrALAZINE (APRESOLINE) 25 MG tablet TAKE 1 TABLET BY MOUTH TWICE A  tablet 3    MULTIVIT-IRON-MIN-FOLIC ACID 3,500-18-0.4 UNIT-MG-MG ORAL CHEW Take by mouth once daily.      omeprazole (PRILOSEC) 20 MG capsule Take 20 mg by mouth once daily.      pravastatin (PRAVACHOL) 20 MG tablet TAKE 1 TABLET BY MOUTH EVERY DAY IN THE EVENING 90 tablet 1    sertraline (ZOLOFT) 50 MG tablet TAKE 1 TABLET BY MOUTH EVERY DAY IN THE MORNING 90 tablet 1    vitamin D 1000 units Tab Take 1,000 mg by mouth once daily.       memantine (NAMENDA) 5 MG Tab TAKE 1 TABLET BY MOUTH EVERY DAY (Patient not taking: No sig reported)  90 tablet 0     No current facility-administered medications on file prior to visit.       Physical Exam  Vitals reviewed.   Constitutional:       Appearance: She is well-developed and well-nourished.   HENT:      Head: Normocephalic and atraumatic.   Eyes:      Extraocular Movements: EOM normal.   Neck:      Thyroid: No thyromegaly.   Cardiovascular:      Rate and Rhythm: Normal rate and regular rhythm.      Heart sounds: Normal heart sounds. No murmur heard.  No gallop.    Pulmonary:      Effort: Pulmonary effort is normal. No respiratory distress.      Breath sounds: Normal breath sounds. No wheezing or rales.   Abdominal:      General: Bowel sounds are normal. There is no distension.      Palpations: Abdomen is soft.      Tenderness: There is no abdominal tenderness. There is no guarding or rebound.   Musculoskeletal:         General: No edema. Normal range of motion.      Cervical back: Normal range of motion and neck supple.      Right lower leg: No edema.      Left lower leg: No edema.   Lymphadenopathy:      Cervical: No cervical adenopathy.   Skin:     Findings: No rash.   Neurological:      Mental Status: She is alert and oriented to person, place, and time.      Comments: Moves all 4 extremities fine.   Psychiatric:         Mood and Affect: Mood and affect normal.         Behavior: Behavior normal.         Thought Content: Thought content normal.         Assessment:       1. Primary hypertension    2. Anxiety with depression    3. Grief at loss of child    4. Other hyperlipidemia    5. Senile dementia without behavioral disturbance    6. Vitamin D deficiency    7. Impaired fasting glucose    8. Gastroesophageal reflux disease, unspecified whether esophagitis present    9. Renal insufficiency    10. Encounter for laboratory test        Plan:       Primary hypertension: Maintain < 2 Gm Na a day diet, and monitor BP at home; keep a log.  -     CBC Auto Differential; Future; Expected date: 02/17/2022  -      Comprehensive Metabolic Panel; Future; Expected date: 02/17/2022  -     Lipid Panel; Future; Expected date: 02/17/2022    Anxiety with depression/grief at lost of a child:  Appears to be handling things okay.  Has been prescribed BuSpar in the past use as needed for anxiety; no other additional medications felt needed.  Continue sertraline 50 mg daily has helped..    Other hyperlipidemia: Maintain low fat high fiber diet, exercise regularly. Weight reduction where indicated.  Continue pravastatin 20 mg every evening.  -     Comprehensive Metabolic Panel; Future; Expected date: 02/17/2022  -     Lipid Panel; Future; Expected date: 02/17/2022    Senile dementia without behavioral disturbance; not on any meds; no help prior w memantine  Does not want to try any additional medications for this.    Vitamin D deficiency:  Continue vitamin D3 1000 IU per day.    Impaired fasting glucose.  Fasting blood sugar 115. Exercise recommended with weight reduction and low carb diet; we'll follow hemoglobin A1c's with you periodically.  -     Comprehensive Metabolic Panel; Future; Expected date: 02/17/2022    Gastroesophageal reflux disease, unspecified whether esophagitis prese; No bedtime snacks; weight reduction.    Renal insufficiency; push fluids w minimum of 6 glasses a day. Limit caffeine.  Knows not to take any NSA ID agents but to use Tylenol over-the-counter for any discomfort.  -     Comprehensive Metabolic Panel; Future; Expected date: 02/17/2022    Encounter for laboratory test; reviewed w pt and daughter.

## 2022-02-17 NOTE — PATIENT INSTRUCTIONS
Primary hypertension: Maintain < 2 Gm Na a day diet, and monitor BP at home; keep a log.  -     CBC Auto Differential; Future; Expected date: 02/17/2022  -     Comprehensive Metabolic Panel; Future; Expected date: 02/17/2022  -     Lipid Panel; Future; Expected date: 02/17/2022    Other hyperlipidemia: Maintain low fat high fiber diet, exercise regularly. Weight reduction where indicated.  -     Comprehensive Metabolic Panel; Future; Expected date: 02/17/2022  -     Lipid Panel; Future; Expected date: 02/17/2022    Senile dementia without behavioral disturbance; not on any meds; no help prior w memantine  Vitamin D deficiency    Impaired fasting glucose.Exercise recommended with weight reduction and low carb diet; we'll follow hemoglobin A1c's with you periodically.  -     Comprehensive Metabolic Panel; Future; Expected date: 02/17/2022    Gastroesophageal reflux disease, unspecified whether esophagitis prese; No bedtime snacks; weight reduction.    Renal insufficiency; push fluids w minimum of 6 glasses a day. Limit caffeine.   -     Comprehensive Metabolic Panel; Future; Expected date: 02/17/2022    Encounter for laboratory test; reviewed w pt and daughter.

## 2022-06-02 ENCOUNTER — TELEPHONE (OUTPATIENT)
Dept: FAMILY MEDICINE | Facility: CLINIC | Age: 87
End: 2022-06-02
Payer: MEDICARE

## 2022-06-02 DIAGNOSIS — N63.20 LEFT BREAST MASS: ICD-10-CM

## 2022-06-02 DIAGNOSIS — N64.59 ABNORMAL BREAST EXAM: ICD-10-CM

## 2022-06-02 DIAGNOSIS — N63.0 PALPABLE MASS OF BREAST: Primary | ICD-10-CM

## 2022-06-02 NOTE — TELEPHONE ENCOUNTER
Spoke w/ pt's dtr. Pt has a hard lump, L out breast x couple of mo that has increased in size, now marble sized. Does not move when pressed. No redness. She would like orders for imaging to be sent to [facility]. I have scheduled pt for appt w/ Dr Talley on 6/23/22(dtr's first available time to bring pt due to work schedule). Pleas review and if OK to have diag mammo and L breast US. Please put order in as EXTERNAL and we will fax to [facility]. Dtr will then sched testing at [facility].

## 2022-06-02 NOTE — TELEPHONE ENCOUNTER
----- Message from Юлия Bauer sent at 6/2/2022  9:10 AM CDT -----  Contact: daughter  Type: Needs Medical Advice  Who Called:  Cristin Valdes (Daughter)  Best Call Back Number: [phone]  Additional Information: Daughter is requesting a call back regarding a lump on patient's breast. She would like to have orders for mammogram or US sent to [facility]. Please advise.

## 2022-06-06 NOTE — TELEPHONE ENCOUNTER
*Mario Alberto pt* Please review message below and advise. Pt's dtr would like to get this scheduled as soon as possible.

## 2022-06-07 ENCOUNTER — TELEPHONE (OUTPATIENT)
Dept: FAMILY MEDICINE | Facility: CLINIC | Age: 87
End: 2022-06-07
Payer: MEDICARE

## 2022-06-07 NOTE — TELEPHONE ENCOUNTER
Spoke with pt daughter and notified of orders placed and sent to [facility]. Cristin verbalized understanding.

## 2022-06-07 NOTE — TELEPHONE ENCOUNTER
----- Message from Wilfredo Casillas sent at 6/7/2022 11:36 AM CDT -----  Type: Needs Medical Advice  Who Called:  Pt's Daughter    Best Call Back Number: [phone]  Additional Information: Pt's daughter calling again in regards to the orders for the Pt.  Wants to know why they haven't heard anything yet.  Please advise -- Thank you

## 2022-06-08 ENCOUNTER — TELEPHONE (OUTPATIENT)
Dept: FAMILY MEDICINE | Facility: CLINIC | Age: 87
End: 2022-06-08
Payer: MEDICARE

## 2022-06-08 NOTE — TELEPHONE ENCOUNTER
Spoke w/ pt's dtr. [facility] will not accept electronically signed orders. Want written signature on the order. Advised her that Dr Alvares is out today and that I will get her to sign these first thing in AM and fax asap. Pt's mammo and US sched for 130pm tomorrow.

## 2022-06-08 NOTE — TELEPHONE ENCOUNTER
----- Message from Medina Deshpande sent at 6/8/2022 10:07 AM CDT -----  Contact: [phone]  Type: Needs Medical Advice  Who Called:  Pts  daughter     Best Call Back Number: [phone]    Additional Information: Pts daughter calling about order sent to [facility] for Mammo. Order was not signed. Has to have Dr or PA signature. Appt is set for tomorrow.

## 2022-06-08 NOTE — TELEPHONE ENCOUNTER
Discussed with Jessica on Friday 06/03/2022 orders were placed by me for miss Rai and a day have bilateral diagnostic breast mammograms with bilateral breast ultrasound, which she was to fax to [facility].  Please check on patient tomorrow to see that patient was able to schedule both of these over at [place] and please call their radiology department to see if they can fax us over the results; thank you

## 2022-06-09 ENCOUNTER — TELEPHONE (OUTPATIENT)
Dept: FAMILY MEDICINE | Facility: CLINIC | Age: 87
End: 2022-06-09
Payer: MEDICARE

## 2022-06-09 NOTE — TELEPHONE ENCOUNTER
This has been taken care of. Pt had imaging done today. Dtr states pt does have an area needing a biopsy and is in the process of getting in w/ Dr Juan. She will call back if referral needed.

## 2022-06-09 NOTE — TELEPHONE ENCOUNTER
----- Message from Jessica Alcantara sent at 6/9/2022  1:34 PM CDT -----  Regarding: advice  Contact: Daughter/Cristin/[phone]  Type: Needs Medical Advice  Who Called:  Daughter/Cristin/[phone]    Additional Information: The doctor order a mammogram and ultrasound. They are at [facility] now but the orders are not. They are very frustrated as they talked to Jnaeen twice about this. Please call as soon as possible.

## 2022-06-16 ENCOUNTER — TELEPHONE (OUTPATIENT)
Dept: FAMILY MEDICINE | Facility: CLINIC | Age: 87
End: 2022-06-16
Payer: MEDICARE

## 2022-06-16 DIAGNOSIS — N63.21 MASS OF UPPER OUTER QUADRANT OF LEFT BREAST: Primary | ICD-10-CM

## 2022-06-16 DIAGNOSIS — R92.8 ABNORMAL MAMMOGRAM OF BOTH BREASTS: ICD-10-CM

## 2022-06-16 NOTE — TELEPHONE ENCOUNTER
----- Message from Wilfredo Casillas sent at 6/16/2022 11:24 AM CDT -----  Type: Needs Medical Advice  Who Called:  pts daughter    Best Call Back Number: [phone]  Additional Information: Needs to speak to someone please advise -- Thank you

## 2022-06-16 NOTE — TELEPHONE ENCOUNTER
Had mammogram done at [place] daughter states there was a mass seen on the mammogram and an order for a biopsy was needed.     Report states   Ultrasound  Impression: solid irregular left breast mass. Irregular hypoechoic regions are scattered throughout the right breast of uncertain etiology. Ultrasound-guided biopsy of left breast mass is advised as well as MRI of the breast to further evaluate the right sided abnormalities.    Mammo  Impression: spiculated mass seen within the upper outer left breast with multiple nodular densities seen within the right. Complete bilateral breast ultrasound is advised.    Daughter states [place] requires a printed order with physical signature be faxed and not an electronic order

## 2022-06-17 NOTE — TELEPHONE ENCOUNTER
Case discussed with patient's daughter who is an RN Cristin.  Ultrasound of breast biopsy left breast orders have been ordered externally and printed and manually signed and will be faxed to central scheduling at [facility].  MRI without contrast bilateral breast has also been ordered in the same manner; printed and physically signed manually.  To be faxed to [facility] Central scheduling as well.  Please make sure both are ordered as stats and a fax tomorrow to [facility] Central scheduling tomorrow morning

## 2022-06-21 ENCOUNTER — TELEPHONE (OUTPATIENT)
Dept: FAMILY MEDICINE | Facility: CLINIC | Age: 87
End: 2022-06-21
Payer: MEDICARE

## 2022-06-21 NOTE — TELEPHONE ENCOUNTER
Orders were printed externally printed on paper and manually signed by me for an ultrasound-guided breast biopsy an MRI of bilateral breast without contrast on 06/16/2022 and should have been sent to [facility] central scheduling the following morning.  Please check on the status of both of these and see if she has had her ultrasound-guided breast biopsy and as well as obtaining the pathology report for me so we can be aware of her status.  Please also inform her daughter who was informed by me 06/16/2022 of the above having been printed externally and manually signed by me to be faxed to central scheduling at [facility] the next morning

## 2022-06-21 NOTE — TELEPHONE ENCOUNTER
----- Message from Ayan Nunez sent at 6/21/2022 10:18 AM CDT -----  Type: Needs Medical Advice  Who Called:  pt daughter Cristin Amaya Call Back Number:  [phone]    Additional Information: pt daughter said the dr was to call in orders for her mother MRI and they have not been sent yet--please advise--thank you!  She also said [facility] need a signed order not an e-signature

## 2022-06-23 ENCOUNTER — TELEPHONE (OUTPATIENT)
Dept: FAMILY MEDICINE | Facility: CLINIC | Age: 87
End: 2022-06-23
Payer: MEDICARE

## 2022-06-23 NOTE — TELEPHONE ENCOUNTER
----- Message from Ludwig Beach sent at 6/23/2022  2:18 PM CDT -----  Contact: [facility]  Type: Patient Call Back    Who called: [facility]     What is the request in detail: Order received for breast MRI/biopsy     Can the clinic reply by [facility]?    Would the patient rather a call back or a response via [facility]?     Best call back number:      Additional Information:

## 2022-06-27 ENCOUNTER — TELEPHONE (OUTPATIENT)
Dept: FAMILY MEDICINE | Facility: CLINIC | Age: 87
End: 2022-06-27
Payer: MEDICARE

## 2022-06-27 DIAGNOSIS — N63.21 MASS OF UPPER OUTER QUADRANT OF LEFT BREAST: Primary | ICD-10-CM

## 2022-06-27 DIAGNOSIS — R92.8 ABNORMAL MAMMOGRAM OF BOTH BREASTS: ICD-10-CM

## 2022-06-27 DIAGNOSIS — N63.0 PALPABLE MASS OF BREAST: ICD-10-CM

## 2022-06-27 DIAGNOSIS — N63.20 LEFT BREAST MASS: ICD-10-CM

## 2022-06-27 NOTE — TELEPHONE ENCOUNTER
----- Message from Magaly Yoana sent at 6/24/2022  2:08 PM CDT -----  Regarding: [employer] called  Name of Who is Calling: MIHAELA SNYDER [[id number]] Jana([employer])      What is the request in detail: order was faxed about MRI of the breast. It needs to say mri of the breast w/wo contrast and the Dr or the NP needs to sign the new order. Please advise       Can the clinic reply by [facility]: No      [facility] [phone]  fax [phone]

## 2022-06-28 NOTE — TELEPHONE ENCOUNTER
Case discussed with Jessica please forward this to her:  [place] requesting orders as MRI of the breast with and without contrast which was sent without contrast to read MRI of the breast with and without contrast.  Ordered as a stat.  These orders have been printed externally but needs to be physically signed by a physician or nurse practitioner.  Please see if a physician or NP will physically sign the orders for me  in the office and then please fax the printed physically signed orders to [facility] Central scheduling as soon as possible.  Please call me at [phone] if I can be of any further assistance.  Please also check back with the patient to ensure that these orders have been scheduled; thank you so much for your help.

## 2022-06-30 ENCOUNTER — TELEPHONE (OUTPATIENT)
Dept: FAMILY MEDICINE | Facility: CLINIC | Age: 87
End: 2022-06-30
Payer: MEDICARE

## 2022-06-30 DIAGNOSIS — E78.49 OTHER HYPERLIPIDEMIA: ICD-10-CM

## 2022-06-30 NOTE — TELEPHONE ENCOUNTER
----- Message from Юлия Dumontnington sent at 6/30/2022 10:54 AM CDT -----  Contact: dayanara  Type: Needs Medical Advice  Who Called:  Dayanara with [employer]   Best Call Back Number: [phone], fax#[phone]  Additional Information: needs orders for mri of breast with and without contrast. Needs to be signed by Dr Llanes or NP, cannot be signed by MA.

## 2022-06-30 NOTE — TELEPHONE ENCOUNTER
No new care gaps identified.  [facility] Embedded Care Gaps. Reference number: [id number]. 6/30/2022   1:47:58 AM LOET

## 2022-06-30 NOTE — TELEPHONE ENCOUNTER
Attention Jessica: Orders for MRI of the breast bilateral with and without contrast reordered.  Please note that they are reordered due to the previous 1 did not print.  These are to be physically signed please present them to me tomorrow morning so I can sign them and they can then be faxed  immediately to [facility] central scheduling to have the MRI of bilateral breast with and without contrast scheduled with patient.  Thank you please call me before hand effect can be of any further assistance.

## 2022-07-04 RX ORDER — PRAVASTATIN SODIUM 20 MG/1
TABLET ORAL
Qty: 90 TABLET | Refills: 2 | Status: SHIPPED | OUTPATIENT
Start: 2022-07-04 | End: 2023-03-05

## 2022-07-04 RX ORDER — AMLODIPINE BESYLATE 5 MG/1
TABLET ORAL
Qty: 90 TABLET | Refills: 1 | Status: SHIPPED | OUTPATIENT
Start: 2022-07-04 | End: 2023-02-23

## 2022-07-04 NOTE — TELEPHONE ENCOUNTER
Refill Routing Note   Medication(s) are not appropriate for processing by [facility] for the following reason(s):      - Required vitals are abnormal    [facility] action(s):  Defer  Approve          Medication reconciliation completed: No     Appointments  past 12m or future 3m with PCP    Date Provider   Last Visit   2/17/2022 Bradley Llanes MD   Next Visit   6/30/2022 Bradley Llanes MD   ED visits in past 90 days: 0        Note composed:8:30 AM 07/04/2022

## 2022-07-11 ENCOUNTER — TELEPHONE (OUTPATIENT)
Dept: FAMILY MEDICINE | Facility: CLINIC | Age: 87
End: 2022-07-11

## 2022-07-11 NOTE — TELEPHONE ENCOUNTER
----- Message from Hermila Castillo, Patient Care Assistant sent at 7/11/2022 10:01 AM CDT -----  Regarding: advice  Contact: Dr. soto  Type: Needs Medical Advice  Who Called:  Dr. soto  Best Call Back Number:      Additional Information: please call Dr. Soto to advise. Thanks!

## 2022-07-20 RX ORDER — SERTRALINE HYDROCHLORIDE 50 MG/1
TABLET, FILM COATED ORAL
Qty: 90 TABLET | Refills: 1 | Status: SHIPPED | OUTPATIENT
Start: 2022-07-20 | End: 2023-03-05

## 2022-08-16 ENCOUNTER — LAB VISIT (OUTPATIENT)
Dept: LAB | Facility: HOSPITAL | Age: 87
End: 2022-08-16
Attending: INTERNAL MEDICINE
Payer: MEDICARE

## 2022-08-16 DIAGNOSIS — I10 PRIMARY HYPERTENSION: ICD-10-CM

## 2022-08-16 DIAGNOSIS — K21.9 GASTROESOPHAGEAL REFLUX DISEASE, UNSPECIFIED WHETHER ESOPHAGITIS PRESENT: ICD-10-CM

## 2022-08-16 DIAGNOSIS — R73.01 IMPAIRED FASTING GLUCOSE: ICD-10-CM

## 2022-08-16 DIAGNOSIS — N28.9 RENAL INSUFFICIENCY: ICD-10-CM

## 2022-08-16 DIAGNOSIS — E78.49 OTHER HYPERLIPIDEMIA: ICD-10-CM

## 2022-08-16 LAB
ALBUMIN SERPL BCP-MCNC: 3.6 G/DL (ref 3.5–5.2)
ALP SERPL-CCNC: 67 U/L (ref 55–135)
ALT SERPL W/O P-5'-P-CCNC: 5 U/L (ref 10–44)
ANION GAP SERPL CALC-SCNC: 8 MMOL/L (ref 8–16)
AST SERPL-CCNC: 14 U/L (ref 10–40)
BASOPHILS # BLD AUTO: 0.05 K/UL (ref 0–0.2)
BASOPHILS NFR BLD: 0.6 % (ref 0–1.9)
BILIRUB SERPL-MCNC: 0.7 MG/DL (ref 0.1–1)
BUN SERPL-MCNC: 15 MG/DL (ref 10–30)
CALCIUM SERPL-MCNC: 9.9 MG/DL (ref 8.7–10.5)
CHLORIDE SERPL-SCNC: 109 MMOL/L (ref 95–110)
CHOLEST SERPL-MCNC: 197 MG/DL (ref 120–199)
CHOLEST/HDLC SERPL: 4.1 {RATIO} (ref 2–5)
CO2 SERPL-SCNC: 23 MMOL/L (ref 23–29)
CREAT SERPL-MCNC: 0.7 MG/DL (ref 0.5–1.4)
DIFFERENTIAL METHOD: ABNORMAL
EOSINOPHIL # BLD AUTO: 0.2 K/UL (ref 0–0.5)
EOSINOPHIL NFR BLD: 2.6 % (ref 0–8)
ERYTHROCYTE [DISTWIDTH] IN BLOOD BY AUTOMATED COUNT: 16 % (ref 11.5–14.5)
EST. GFR  (NO RACE VARIABLE): >60 ML/MIN/1.73 M^2
GLUCOSE SERPL-MCNC: 98 MG/DL (ref 70–110)
HCT VFR BLD AUTO: 38.4 % (ref 37–48.5)
HDLC SERPL-MCNC: 48 MG/DL (ref 40–75)
HDLC SERPL: 24.4 % (ref 20–50)
HGB BLD-MCNC: 11.8 G/DL (ref 12–16)
IMM GRANULOCYTES # BLD AUTO: 0.02 K/UL (ref 0–0.04)
IMM GRANULOCYTES NFR BLD AUTO: 0.2 % (ref 0–0.5)
LDLC SERPL CALC-MCNC: 123.6 MG/DL (ref 63–159)
LYMPHOCYTES # BLD AUTO: 2.8 K/UL (ref 1–4.8)
LYMPHOCYTES NFR BLD: 34 % (ref 18–48)
MCH RBC QN AUTO: 26.4 PG (ref 27–31)
MCHC RBC AUTO-ENTMCNC: 30.7 G/DL (ref 32–36)
MCV RBC AUTO: 86 FL (ref 82–98)
MONOCYTES # BLD AUTO: 0.6 K/UL (ref 0.3–1)
MONOCYTES NFR BLD: 7.5 % (ref 4–15)
NEUTROPHILS # BLD AUTO: 4.5 K/UL (ref 1.8–7.7)
NEUTROPHILS NFR BLD: 55.1 % (ref 38–73)
NONHDLC SERPL-MCNC: 149 MG/DL
NRBC BLD-RTO: 0 /100 WBC
PLATELET # BLD AUTO: 272 K/UL (ref 150–450)
PMV BLD AUTO: 11.6 FL (ref 9.2–12.9)
POTASSIUM SERPL-SCNC: 4.1 MMOL/L (ref 3.5–5.1)
PROT SERPL-MCNC: 6.4 G/DL (ref 6–8.4)
RBC # BLD AUTO: 4.47 M/UL (ref 4–5.4)
SODIUM SERPL-SCNC: 140 MMOL/L (ref 136–145)
TRIGL SERPL-MCNC: 127 MG/DL (ref 30–150)
WBC # BLD AUTO: 8.12 K/UL (ref 3.9–12.7)

## 2022-08-16 PROCEDURE — 36415 COLL VENOUS BLD VENIPUNCTURE: CPT | Mod: PN | Performed by: INTERNAL MEDICINE

## 2022-08-16 PROCEDURE — 85025 COMPLETE CBC W/AUTO DIFF WBC: CPT | Performed by: INTERNAL MEDICINE

## 2022-08-16 PROCEDURE — 80061 LIPID PANEL: CPT | Performed by: INTERNAL MEDICINE

## 2022-08-16 PROCEDURE — 80053 COMPREHEN METABOLIC PANEL: CPT | Performed by: INTERNAL MEDICINE

## 2022-08-18 ENCOUNTER — TELEPHONE (OUTPATIENT)
Dept: FAMILY MEDICINE | Facility: CLINIC | Age: 87
End: 2022-08-18
Payer: MEDICARE

## 2022-08-18 NOTE — TELEPHONE ENCOUNTER
----- Message from Ayan Nunez sent at 8/18/2022 12:35 PM CDT -----  Type:  Sooner Appointment Request    Caller is requesting a sooner appointment.  Caller declined first available appointment listed below.  Caller will not accept being placed on the waitlist and is requesting a message be sent to doctor.    Name of Caller:  pt daughter Cristin  When is the first available appointment?  Today but she need to reschedule the first avail was 9/29 and her daughter said she need to be seen sooner she just done blood work and do not want to have to do the again--please call her   Symptoms:  f/u 6 mo pt coming at 1245  Best Call Back Number:  [phone]  Additional Information:  please advise--thank you

## 2023-01-17 DIAGNOSIS — I10 PRIMARY HYPERTENSION: Primary | ICD-10-CM

## 2023-01-17 DIAGNOSIS — E78.49 OTHER HYPERLIPIDEMIA: ICD-10-CM

## 2023-01-17 NOTE — TELEPHONE ENCOUNTER
----- Message from Lily Beach sent at 1/17/2023 12:12 PM CST -----  Contact: pt's daughter Cristin [phone]  Type: Needs Medical Advice  Who Called:  pt's daughter Cristin  Best Call Back Number: [phone]   Additional Information: pt's daughter is calling the office today to see if the dr can put in orders before her appt on  02/23

## 2023-01-17 NOTE — TELEPHONE ENCOUNTER
Please advise of any labs needed prior to upcoming appt.   Will contact to schedule once ordered.

## 2023-01-18 NOTE — TELEPHONE ENCOUNTER
Please call patient's daughter and tell labs orders have been sent to [facility] for her to be obtained after an overnight fast of at least 8 hours for her mom.  Hope all is well with them.

## 2023-01-20 NOTE — TELEPHONE ENCOUNTER
Pt is labs and appt. Daughter states she needs a refill on Hydralazine, please advise    Rx pending if ok

## 2023-01-23 RX ORDER — HYDRALAZINE HYDROCHLORIDE 25 MG/1
25 TABLET, FILM COATED ORAL 2 TIMES DAILY
Qty: 180 TABLET | Refills: 1 | Status: SHIPPED | OUTPATIENT
Start: 2023-01-23 | End: 2023-03-05

## 2023-02-16 ENCOUNTER — LAB VISIT (OUTPATIENT)
Dept: LAB | Facility: HOSPITAL | Age: 88
End: 2023-02-16
Attending: INTERNAL MEDICINE
Payer: MEDICARE

## 2023-02-16 DIAGNOSIS — I10 PRIMARY HYPERTENSION: ICD-10-CM

## 2023-02-16 DIAGNOSIS — E78.49 OTHER HYPERLIPIDEMIA: ICD-10-CM

## 2023-02-16 PROCEDURE — 85025 COMPLETE CBC W/AUTO DIFF WBC: CPT | Mod: HCNC | Performed by: INTERNAL MEDICINE

## 2023-02-16 PROCEDURE — 80053 COMPREHEN METABOLIC PANEL: CPT | Mod: HCNC | Performed by: INTERNAL MEDICINE

## 2023-02-16 PROCEDURE — 80061 LIPID PANEL: CPT | Mod: HCNC | Performed by: INTERNAL MEDICINE

## 2023-02-16 PROCEDURE — 36415 COLL VENOUS BLD VENIPUNCTURE: CPT | Mod: HCNC,PN | Performed by: INTERNAL MEDICINE

## 2023-02-17 LAB
ALBUMIN SERPL BCP-MCNC: 3.5 G/DL (ref 3.5–5.2)
ALP SERPL-CCNC: 95 U/L (ref 55–135)
ALT SERPL W/O P-5'-P-CCNC: 10 U/L (ref 10–44)
ANION GAP SERPL CALC-SCNC: 10 MMOL/L (ref 8–16)
AST SERPL-CCNC: 16 U/L (ref 10–40)
BASOPHILS # BLD AUTO: 0.09 K/UL (ref 0–0.2)
BASOPHILS NFR BLD: 1.1 % (ref 0–1.9)
BILIRUB SERPL-MCNC: 0.5 MG/DL (ref 0.1–1)
BUN SERPL-MCNC: 17 MG/DL (ref 10–30)
CALCIUM SERPL-MCNC: 9.8 MG/DL (ref 8.7–10.5)
CHLORIDE SERPL-SCNC: 107 MMOL/L (ref 95–110)
CHOLEST SERPL-MCNC: 186 MG/DL (ref 120–199)
CHOLEST/HDLC SERPL: 3.3 {RATIO} (ref 2–5)
CO2 SERPL-SCNC: 23 MMOL/L (ref 23–29)
CREAT SERPL-MCNC: 0.9 MG/DL (ref 0.5–1.4)
DIFFERENTIAL METHOD: ABNORMAL
EOSINOPHIL # BLD AUTO: 0.3 K/UL (ref 0–0.5)
EOSINOPHIL NFR BLD: 3.8 % (ref 0–8)
ERYTHROCYTE [DISTWIDTH] IN BLOOD BY AUTOMATED COUNT: 15.1 % (ref 11.5–14.5)
EST. GFR  (NO RACE VARIABLE): 58.9 ML/MIN/1.73 M^2
GLUCOSE SERPL-MCNC: 148 MG/DL (ref 70–110)
HCT VFR BLD AUTO: 38.7 % (ref 37–48.5)
HDLC SERPL-MCNC: 56 MG/DL (ref 40–75)
HDLC SERPL: 30.1 % (ref 20–50)
HGB BLD-MCNC: 11.3 G/DL (ref 12–16)
IMM GRANULOCYTES # BLD AUTO: 0.02 K/UL (ref 0–0.04)
IMM GRANULOCYTES NFR BLD AUTO: 0.2 % (ref 0–0.5)
LDLC SERPL CALC-MCNC: 111.4 MG/DL (ref 63–159)
LYMPHOCYTES # BLD AUTO: 2.9 K/UL (ref 1–4.8)
LYMPHOCYTES NFR BLD: 35.4 % (ref 18–48)
MCH RBC QN AUTO: 24.2 PG (ref 27–31)
MCHC RBC AUTO-ENTMCNC: 29.2 G/DL (ref 32–36)
MCV RBC AUTO: 83 FL (ref 82–98)
MONOCYTES # BLD AUTO: 0.5 K/UL (ref 0.3–1)
MONOCYTES NFR BLD: 6 % (ref 4–15)
NEUTROPHILS # BLD AUTO: 4.3 K/UL (ref 1.8–7.7)
NEUTROPHILS NFR BLD: 53.5 % (ref 38–73)
NONHDLC SERPL-MCNC: 130 MG/DL
NRBC BLD-RTO: 0 /100 WBC
PLATELET # BLD AUTO: 338 K/UL (ref 150–450)
PMV BLD AUTO: 11.3 FL (ref 9.2–12.9)
POTASSIUM SERPL-SCNC: 4.4 MMOL/L (ref 3.5–5.1)
PROT SERPL-MCNC: 6.8 G/DL (ref 6–8.4)
RBC # BLD AUTO: 4.66 M/UL (ref 4–5.4)
SODIUM SERPL-SCNC: 140 MMOL/L (ref 136–145)
TRIGL SERPL-MCNC: 93 MG/DL (ref 30–150)
WBC # BLD AUTO: 8.06 K/UL (ref 3.9–12.7)

## 2023-02-23 ENCOUNTER — OFFICE VISIT (OUTPATIENT)
Dept: FAMILY MEDICINE | Facility: CLINIC | Age: 88
End: 2023-02-23
Payer: MEDICARE

## 2023-02-23 VITALS
HEIGHT: 60 IN | WEIGHT: 111.88 LBS | HEART RATE: 80 BPM | BODY MASS INDEX: 21.97 KG/M2 | DIASTOLIC BLOOD PRESSURE: 62 MMHG | SYSTOLIC BLOOD PRESSURE: 150 MMHG

## 2023-02-23 DIAGNOSIS — R63.4 ABNORMAL WEIGHT LOSS: ICD-10-CM

## 2023-02-23 DIAGNOSIS — Z17.0 MALIGNANT NEOPLASM OF OVERLAPPING SITES OF LEFT BREAST IN FEMALE, ESTROGEN RECEPTOR POSITIVE: ICD-10-CM

## 2023-02-23 DIAGNOSIS — D64.9 ANEMIA, UNSPECIFIED TYPE: ICD-10-CM

## 2023-02-23 DIAGNOSIS — Z01.89 ENCOUNTER FOR LABORATORY TEST: ICD-10-CM

## 2023-02-23 DIAGNOSIS — D50.8 IRON DEFICIENCY ANEMIA SECONDARY TO INADEQUATE DIETARY IRON INTAKE: ICD-10-CM

## 2023-02-23 DIAGNOSIS — F03.90 SENILE DEMENTIA WITHOUT BEHAVIORAL DISTURBANCE: ICD-10-CM

## 2023-02-23 DIAGNOSIS — R41.89 COGNITIVE IMPAIRMENT: ICD-10-CM

## 2023-02-23 DIAGNOSIS — C50.812 MALIGNANT NEOPLASM OF OVERLAPPING SITES OF LEFT BREAST IN FEMALE, ESTROGEN RECEPTOR POSITIVE: ICD-10-CM

## 2023-02-23 DIAGNOSIS — F41.8 ANXIETY WITH DEPRESSION: ICD-10-CM

## 2023-02-23 DIAGNOSIS — E78.49 OTHER HYPERLIPIDEMIA: ICD-10-CM

## 2023-02-23 DIAGNOSIS — R79.89 DECREASED THYROID STIMULATING HORMONE (TSH) LEVEL: ICD-10-CM

## 2023-02-23 DIAGNOSIS — I10 PRIMARY HYPERTENSION: Primary | ICD-10-CM

## 2023-02-23 DIAGNOSIS — N28.9 ACUTE RENAL INSUFFICIENCY: ICD-10-CM

## 2023-02-23 DIAGNOSIS — Z92.21 H/O AROMATASE INHIBITOR THERAPY: ICD-10-CM

## 2023-02-23 PROCEDURE — 99214 PR OFFICE/OUTPT VISIT, EST, LEVL IV, 30-39 MIN: ICD-10-PCS | Mod: HCNC,S$GLB,, | Performed by: INTERNAL MEDICINE

## 2023-02-23 PROCEDURE — 1159F PR MEDICATION LIST DOCUMENTED IN MEDICAL RECORD: ICD-10-PCS | Mod: HCNC,CPTII,S$GLB, | Performed by: INTERNAL MEDICINE

## 2023-02-23 PROCEDURE — 3288F FALL RISK ASSESSMENT DOCD: CPT | Mod: HCNC,CPTII,S$GLB, | Performed by: INTERNAL MEDICINE

## 2023-02-23 PROCEDURE — 99499 UNLISTED E&M SERVICE: CPT | Mod: HCNC,S$GLB,, | Performed by: INTERNAL MEDICINE

## 2023-02-23 PROCEDURE — 1126F PR PAIN SEVERITY QUANTIFIED, NO PAIN PRESENT: ICD-10-PCS | Mod: HCNC,CPTII,S$GLB, | Performed by: INTERNAL MEDICINE

## 2023-02-23 PROCEDURE — 1159F MED LIST DOCD IN RCRD: CPT | Mod: HCNC,CPTII,S$GLB, | Performed by: INTERNAL MEDICINE

## 2023-02-23 PROCEDURE — 99999 PR PBB SHADOW E&M-EST. PATIENT-LVL III: ICD-10-PCS | Mod: PBBFAC,HCNC,, | Performed by: INTERNAL MEDICINE

## 2023-02-23 PROCEDURE — 1126F AMNT PAIN NOTED NONE PRSNT: CPT | Mod: HCNC,CPTII,S$GLB, | Performed by: INTERNAL MEDICINE

## 2023-02-23 PROCEDURE — 99214 OFFICE O/P EST MOD 30 MIN: CPT | Mod: HCNC,S$GLB,, | Performed by: INTERNAL MEDICINE

## 2023-02-23 PROCEDURE — 99999 PR PBB SHADOW E&M-EST. PATIENT-LVL III: CPT | Mod: PBBFAC,HCNC,, | Performed by: INTERNAL MEDICINE

## 2023-02-23 PROCEDURE — 3288F PR FALLS RISK ASSESSMENT DOCUMENTED: ICD-10-PCS | Mod: HCNC,CPTII,S$GLB, | Performed by: INTERNAL MEDICINE

## 2023-02-23 PROCEDURE — 1101F PR PT FALLS ASSESS DOC 0-1 FALLS W/OUT INJ PAST YR: ICD-10-PCS | Mod: HCNC,CPTII,S$GLB, | Performed by: INTERNAL MEDICINE

## 2023-02-23 PROCEDURE — 99499 RISK ADDL DX/OHS AUDIT: ICD-10-PCS | Mod: HCNC,S$GLB,, | Performed by: INTERNAL MEDICINE

## 2023-02-23 PROCEDURE — 1101F PT FALLS ASSESS-DOCD LE1/YR: CPT | Mod: HCNC,CPTII,S$GLB, | Performed by: INTERNAL MEDICINE

## 2023-02-23 RX ORDER — AMLODIPINE BESYLATE 2.5 MG/1
2.5 TABLET ORAL DAILY
Qty: 30 TABLET | Refills: 2 | Status: SHIPPED | OUTPATIENT
Start: 2023-02-23 | End: 2023-05-23

## 2023-02-23 NOTE — Clinical Note
Please call patient's daughter and tell on further review since we added the blood pressure medicine I would like to see her back in 4 months instead of 6 with labs.  Please ex daughter to check her blood pressure in about 7 in 10 days and give us a call if it is still elevated with greater than 140/90.  Thank you

## 2023-02-23 NOTE — PROGRESS NOTES
Subjective:       Patient ID: Margarita Cruz is a 89 y.o. female.    Chief Complaint: Results (F/u results)    HPI:  Here today with her daughter who is an RN for routine reassessment visit.  She is reportedly been eating and drinking okay.  With regards to her breast cancer her antiestrogen regimen is not tolerated.  Followed by oncology Dr. Brown.  Sees lost 8 lb since 01/12/2022.  With a body mass index 21.85.  Offered mirtazapine and declined with regards to high anxiety and depression she is doing fine she randomly uses a meds mostly not of note.  Has had progressive short-term memory loss not on Aricept and Namenda as they made no difference when tried earlier.  She cares for herself under her daughter supervision with regards to other hyperlipidemia can move to a regular diet.  Lipid profile:  Cholesterol 186/triglyceride 93/HDL 56/.  Minimum renal insufficiency with GFR 58.9 and creatinine 0.9 BUN 17.  Hemoglobin is minimally anemic at 11.3  For other topics discussed please see assessment and plan of care below, and for above as well.        Review of Systems   Constitutional:  Negative for appetite change and fever.   HENT:  Negative for congestion, postnasal drip, rhinorrhea and sinus pressure.    Eyes:  Negative for discharge and itching.   Respiratory:  Negative for cough, chest tightness, shortness of breath and wheezing.    Cardiovascular:  Negative for chest pain, palpitations and leg swelling.   Gastrointestinal:  Negative for abdominal distention, abdominal pain, blood in stool, constipation, diarrhea, nausea and vomiting.   Endocrine: Negative for polydipsia, polyphagia and polyuria.   Genitourinary:  Negative for dysuria and hematuria.   Musculoskeletal:  Negative for arthralgias and myalgias.   Skin:  Negative for rash.   Allergic/Immunologic: Negative for environmental allergies and food allergies.   Neurological:  Negative for tremors, seizures and syncope.   Hematological:  Negative  for adenopathy. Does not bruise/bleed easily.   Psychiatric/Behavioral:  Negative for dysphoric mood. The patient is not nervous/anxious.     Objective:        Vitals:    02/23/23 1049   BP: (!) 150/62   BP Location: Left arm   Pulse: 80   Weight: 50.8 kg (111 lb 14.1 oz)   Height: 5' (1.524 m)       BMI Readings from Last 3 Encounters:   02/23/23 21.85 kg/m²   02/17/22 22.43 kg/m²   01/12/22 23.47 kg/m²        Wt Readings from Last 3 Encounters:   02/23/23 1049 50.8 kg (111 lb 14.1 oz)   02/17/22 1323 53.8 kg (118 lb 11.5 oz)   01/12/22 1558 54.5 kg (120 lb 2.4 oz)        BP Readings from Last 3 Encounters:   02/23/23 (!) 150/62   02/17/22 (!) 140/60   01/12/22 (!) 173/81        There are no preventive care reminders to display for this patient.     Health Maintenance Due   Topic Date Due    TETANUS VACCINE  Never done    Shingles Vaccine (1 of 2) Never done    Pneumococcal Vaccines (Age 65+) (1 - PCV) Never done    COVID-19 Vaccine (3 - Booster for Pfizer series) 04/14/2021    Influenza Vaccine (1) 09/01/2022    Hemoglobin A1c (Prediabetes)  10/01/2022         Past Medical History:   Diagnosis Date    Anxiety     Depression     Hypertension     Hypothyroidism     Iron deficiency anemia     due to blood loss    Osteoporosis     PUD (peptic ulcer disease) 11/2016    required 2 units pRBC's due to bleeding; [facility]; GI/Dr Jamison.    Pure hypercholesterolemia     Thyroid disease     Vitamin D deficiency        Past Surgical History:   Procedure Laterality Date    APPENDECTOMY      HYSTERECTOMY      thryroid         Social History     Tobacco Use    Smoking status: Former    Smokeless tobacco: Never       No family history on file.    Review of patient's allergies indicates:  No Known Allergies    Current Outpatient Medications on File Prior to Visit   Medication Sig Dispense Refill    busPIRone (BUSPAR) 5 MG Tab 5 mg po TID as needed for anxiety (Patient not taking: Reported on 2/23/2023) 50 tablet 1    calcium  carbonate (OS-MARGARITA) 600 mg (1,500 mg) Tab Take 600 mg by mouth 2 (two) times daily with meals.      diphenhydrAMINE-acetaminophen (TYLENOL PM)  mg Tab Take 1 tablet by mouth nightly as needed.      MULTIVIT-IRON-MIN-FOLIC ACID 3,500-18-0.4 UNIT-MG-MG ORAL CHEW Take by mouth once daily.      omeprazole (PRILOSEC) 20 MG capsule Take 20 mg by mouth once daily.      vitamin D 1000 units Tab Take 1,000 mg by mouth once daily.       [DISCONTINUED] hydrALAZINE (APRESOLINE) 25 MG tablet Take 1 tablet (25 mg total) by mouth 2 (two) times daily. (Patient not taking: Reported on 2/23/2023) 180 tablet 1    [DISCONTINUED] memantine (NAMENDA) 5 MG Tab TAKE 1 TABLET BY MOUTH EVERY DAY (Patient not taking: Reported on 2/23/2023) 90 tablet 0    [DISCONTINUED] pravastatin (PRAVACHOL) 20 MG tablet TAKE 1 TABLET BY MOUTH EVERY DAY IN THE EVENING (Patient not taking: Reported on 2/23/2023) 90 tablet 2    [DISCONTINUED] sertraline (ZOLOFT) 50 MG tablet TAKE 1 TABLET BY MOUTH EVERY DAY IN THE MORNING (Patient not taking: Reported on 2/23/2023) 90 tablet 1     No current facility-administered medications on file prior to visit.       Physical Exam  Vitals reviewed.   Constitutional:       Appearance: She is well-developed.   HENT:      Head: Normocephalic and atraumatic.   Neck:      Thyroid: No thyromegaly.   Cardiovascular:      Rate and Rhythm: Normal rate and regular rhythm.      Heart sounds: Normal heart sounds. No murmur heard.    No gallop.   Pulmonary:      Effort: Pulmonary effort is normal. No respiratory distress.      Breath sounds: Normal breath sounds. No wheezing or rales.   Abdominal:      General: Bowel sounds are normal. There is no distension.      Palpations: Abdomen is soft.      Tenderness: There is no abdominal tenderness. There is no guarding or rebound.   Musculoskeletal:         General: Normal range of motion.      Cervical back: Normal range of motion and neck supple.      Right lower leg: No edema.       Left lower leg: No edema.   Lymphadenopathy:      Cervical: No cervical adenopathy.   Skin:     Findings: No rash.   Neurological:      Mental Status: She is alert and oriented to person, place, and time.      Comments: Moves all 4 extremities fine.   Psychiatric:         Behavior: Behavior normal.         Thought Content: Thought content normal.       Assessment:       1. Primary hypertension    2. Anxiety with depression    3. Cognitive impairment    4. Senile dementia without behavioral disturbance    5. Malignant neoplasm of overlapping sites of left breast in female, estrogen receptor positive    6. H/O aromatase inhibitor therapy    7. Abnormal weight loss    8. Iron deficiency anemia secondary to inadequate dietary iron intake    9. Acute renal insufficiency    10. Decreased thyroid stimulating hormone (TSH) level    11. Anemia, unspecified type    12. Other hyperlipidemia    13. Encounter for laboratory test          Plan:       Primary hypertension: Maintain < 2 Gm Na a day diet, and monitor BP at home; keep a log. Resume amlodipine at 2.5 mg a day.  Blood pressure here today mildly elevated at 1 50/62 manually with pulse 80.  Stressed the benefit of periodic home blood pressure monitoring at home.         -     amLODIPine (NORVASC) 2.5 MG tablet; Take 1 tablet (2.5 mg total) by mouth once daily. generic  Dispense: 30 tablet; Refill: 2  -     CBC Auto Differential; Future; Expected date: 02/23/2023  -     Basic Metabolic Panel; Future; Expected date: 02/23/2023  -     TSH; Future; Expected date: 02/23/2023  -     T4, Free; Future; Expected date: 02/23/2023    Anxiety with depression: doing fine off meds for this; has buspar to use as needed.     Cognitive impairment; meds for this not desired; they have not helped.     Senile dementia without behavioral disturbance: As above    Malignant neoplasm of overlapping sites of left breast in female; estrogen receptor positive; followed by oncology   [place]    History of aromatase inhibitor therapy; later stopped as not tolerated according to the patient's daughter who is an RN    Iron deficiency anemia secondary to inadequate dietary iron intake; Women's 50+ MVI one a day.  Hemoglobin 11.3.  -     CBC Auto Differential; Future; Expected date: 02/23/2023    Encounter for laboratory test.  Labs reviewed and discussed with patient and her daughter who is an RN and ordered for follow-up    Acute renal insufficiency; push fluids more. Goal at minimum 6-8 glasses a day.  GFR minimally reduced at 58.9 with BUN over creatinine 17/0.9.  Nonfasting glucose 148.   -     Basic Metabolic Panel; Future; Expected date: 02/23/2023    Decreased thyroid stimulating hormone (TSH) level; has been asymptomatic.   -     TSH; Future; Expected date: 02/23/2023  -     T4, Free; Future; Expected date: 02/23/2023    Anemia, unspecified type; Hg 11.3; take Women's 50+ MVI one a day  -     CBC Auto Differential; Future; Expected date: 02/23/2023  -     Ferritin; Future; Expected date: 02/23/2023    Abnormal weight loss:  Has lost 8 lb since January 12, 2022.  BMI 21.85; recommend she take boost or Ensure with 20-30 g of protein 1-2 cans a day for nutrition; mirtazapine declined.  Patient denies any depression.:      Other hyperlipidemia:  Feel can discontinue pravastatin so as to not cause any muscle aches joint pains or fatigue, have also recommended removing a low-fat diet and resuming a regular diet except for salt restriction to help her blood pressure

## 2023-02-23 NOTE — PATIENT INSTRUCTIONS
Primary hypertension: Maintain < 2 Gm Na a day diet, and monitor BP at home; keep a log. Resume amlodipine at 2.5 mg a day.   -     amLODIPine (NORVASC) 2.5 MG tablet; Take 1 tablet (2.5 mg total) by mouth once daily. generic  Dispense: 30 tablet; Refill: 2  -     CBC Auto Differential; Future; Expected date: 02/23/2023  -     Basic Metabolic Panel; Future; Expected date: 02/23/2023  -     TSH; Future; Expected date: 02/23/2023  -     T4, Free; Future; Expected date: 02/23/2023    Anxiety with depression: doing fine off meds for this; has buspar to use as needed.     Cognitive impairment; meds for this not desired; they have not helped.     Senile dementia without behavioral disturbance    Iron deficiency anemia secondary to inadequate dietary iron intake; Women's 50+ MVI one a day.   -     CBC Auto Differential; Future; Expected date: 02/23/2023    Encounter for laboratory test    Acute renal insufficiency; push fluids more. Goal at minimum 6-8 glasses a day.   -     Basic Metabolic Panel; Future; Expected date: 02/23/2023    Decreased thyroid stimulating hormone (TSH) level; has been asymptomatic.   -     TSH; Future; Expected date: 02/23/2023  -     T4, Free; Future; Expected date: 02/23/2023    Anemia, unspecified type; Hg 11.3; take Women's 50+ MVI one a day  -     CBC Auto Differential; Future; Expected date: 02/23/2023  -     Ferritin; Future; Expected date: 02/23/2023

## 2023-05-22 DIAGNOSIS — I10 PRIMARY HYPERTENSION: ICD-10-CM

## 2023-05-22 NOTE — TELEPHONE ENCOUNTER
No care due was identified.  Health [facility] Embedded Care Due Messages. Reference number: [id number].   5/22/2023 1:09:43 AM CDT

## 2023-05-22 NOTE — TELEPHONE ENCOUNTER
Refill Routing Note   Medication(s) are not appropriate for processing by [facility] for the following reason(s):      Required vitals abnormal    [facility] action(s):  Defer None identified            Appointments  past 12m or future 3m with PCP    Date Provider   Last Visit   2/23/2023 Bradley Llanes MD   Next Visit   7/6/2023 Bradley Llanes MD   ED visits in past 90 days: 0        Note composed:9:24 AM 05/22/2023

## 2023-05-23 RX ORDER — AMLODIPINE BESYLATE 2.5 MG/1
2.5 TABLET ORAL DAILY
Qty: 90 TABLET | Refills: 3 | Status: SHIPPED | OUTPATIENT
Start: 2023-05-23 | End: 2024-03-14

## 2023-09-21 ENCOUNTER — TELEPHONE (OUTPATIENT)
Dept: FAMILY MEDICINE | Facility: CLINIC | Age: 88
End: 2023-09-21
Payer: MEDICARE

## 2023-09-22 NOTE — TELEPHONE ENCOUNTER
Attempted to contact pt. No answer, unable to leave message.    Appt scheduled for first available. 9/29 at 11AM with SAM Leyva.

## 2023-09-22 NOTE — TELEPHONE ENCOUNTER
----- Message from Medina Deshpande sent at 9/21/2023 11:41 AM CDT -----  Contact: Cristin [phone]  Type:  Sooner Appointment Request    Caller is requesting a sooner appointment.  Caller declined first available appointment listed below.  Caller will not accept being placed on the waitlist and is requesting a message be sent to doctor.    Name of Caller:  Cristin davidson daughter   When is the first available appointment?  10/31  Symptoms:  Pt has been falling   Best Call Back Number:  [phone]    Additional Information:  Pt will see who ever is first avail.

## 2023-11-02 ENCOUNTER — PATIENT OUTREACH (OUTPATIENT)
Dept: ADMINISTRATIVE | Facility: HOSPITAL | Age: 88
End: 2023-11-02
Payer: MEDICARE

## 2023-11-02 NOTE — PROGRESS NOTES
Population Health Chart Review & Patient Outreach Details    Outreach Performed: YES Telephone Successful    Additional Pop Health Notes:           Updates Requested / Reviewed:      Immunizations Reconciliation Completed or Queried: [place] and Updated Care Coordination Note         Health Maintenance Topics Overdue:    Health Maintenance Due   Topic Date Due    TETANUS VACCINE  Never done    Shingles Vaccine (1 of 2) Never done    RSV Vaccine (Age 60+) (1 - 1-dose 60+ series) Never done    Pneumococcal Vaccines (Age 65+) (1 - PCV) Never done    Hemoglobin A1c (Prediabetes)  10/01/2022    Influenza Vaccine (1) 09/01/2023    COVID-19 Vaccine (3 - 2023-24 season) 09/01/2023         Health Maintenance Topic(s) Outreach Outcomes & Actions Taken:    Provider Pt Reattribution - Outreach Outcomes & Actions Taken  : Scheduled Appt with Another Provider

## 2024-02-14 ENCOUNTER — OFFICE VISIT (OUTPATIENT)
Dept: URGENT CARE | Facility: CLINIC | Age: 89
End: 2024-02-14
Payer: MEDICARE

## 2024-02-14 VITALS
TEMPERATURE: 98 F | RESPIRATION RATE: 18 BRPM | BODY MASS INDEX: 21.99 KG/M2 | WEIGHT: 112 LBS | SYSTOLIC BLOOD PRESSURE: 176 MMHG | HEART RATE: 104 BPM | DIASTOLIC BLOOD PRESSURE: 81 MMHG | HEIGHT: 60 IN | OXYGEN SATURATION: 99 %
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
## 2024-02-14 DIAGNOSIS — Z23 NEED FOR DIPHTHERIA-TETANUS-PERTUSSIS (TDAP) VACCINE: ICD-10-CM

## 2024-02-14 DIAGNOSIS — S51.811A SKIN TEAR OF FOREARM WITHOUT COMPLICATION, RIGHT, INITIAL ENCOUNTER: ICD-10-CM

## 2024-02-14 PROCEDURE — 99203 OFFICE O/P NEW LOW 30 MIN: CPT | Mod: 25,S$GLB,, | Performed by: NURSE PRACTITIONER

## 2024-02-14 PROCEDURE — 90715 TDAP VACCINE 7 YRS/> IM: CPT | Mod: S$GLB,,, | Performed by: NURSE PRACTITIONER

## 2024-02-14 PROCEDURE — 90471 IMMUNIZATION ADMIN: CPT | Mod: S$GLB,,, | Performed by: NURSE PRACTITIONER

## 2024-02-14 RX ORDER — AMOXICILLIN AND CLAVULANATE POTASSIUM 875; 125 MG/1; MG/1
1 TABLET, FILM COATED ORAL 2 TIMES DAILY
Qty: 14 TABLET | Refills: 0 | Status: SHIPPED | OUTPATIENT
Start: 2024-02-14 | End: 2024-03-14

## 2024-02-14 RX ORDER — MUPIROCIN 20 MG/G
OINTMENT TOPICAL
Qty: 22 G | Refills: 0 | Status: SHIPPED | OUTPATIENT
Start: 2024-02-14 | End: 2024-03-14

## 2024-02-14 NOTE — PATIENT INSTRUCTIONS
Please observe the wound at each dressing change.  If there are any changes to the lesions such as; spreading redness, fever or pus, or if you develop fever, please return to this clinic or your primary care provider for further evaluation.

## 2024-02-14 NOTE — PROGRESS NOTES
Subjective:      Patient ID: Margarita Cruz is a 89 y.o. female.    Vitals:  height is 5' (1.524 m) and weight is 50.8 kg (111 lb 15.9 oz). Her oral temperature is 97.5 °F (36.4 °C). Her blood pressure is 176/81 (abnormal) and her pulse is 104. Her respiration is 18 and oxygen saturation is 99%.     Chief Complaint: Laceration    Pt's cat attacked owner- bite and scratch marks on pt's left arm. This event occurred this morning. Cat is updated on all its shots.     Provider note begins below:    Patient brought to the clinic by her son for multiple skin tears and puncture marks to her right forearm by her cat earlier today.  Patient states they can not startled and scratched and bit her leg forearm.  Hemostasis prior to admit.    Other  This is a new problem. The current episode started today. The problem occurs constantly. The problem has been unchanged. Nothing aggravates the symptoms. She has tried nothing for the symptoms. The treatment provided no relief.       Skin:  Positive for laceration and lesion. Negative for erythema.    Objective:     Physical Exam   Constitutional: She is oriented to person, place, and time. She appears well-developed. No distress.   HENT:   Head: Normocephalic and atraumatic. Head is without abrasion, without contusion and without laceration.   Ears:   Right Ear: External ear normal.   Left Ear: External ear normal.   Nose: Nose normal.   Mouth/Throat: Oropharynx is clear and moist and mucous membranes are normal.   Eyes: Conjunctivae, EOM and lids are normal. Pupils are equal, round, and reactive to light.   Neck: Trachea normal and phonation normal. Neck supple.   Cardiovascular: Normal rate.   Pulmonary/Chest: Effort normal.   Musculoskeletal: Normal range of motion.         General: Normal range of motion.      Right upper arm: She exhibits laceration.      Right forearm: She exhibits laceration.   Neurological: She is alert and oriented to person, place, and time.   Skin: Skin  is warm, dry, intact and no rash. Capillary refill takes less than 2 seconds. Lacerations - upper ext.:  upper right arm and right forearmNo abrasion, No burn, No bruising, No erythema and No ecchymosis        Psychiatric: Her speech is normal and behavior is normal. Judgment and thought content normal.   Nursing note and vitals reviewed.    Assessment:     1. Cat scratch    2. Skin tear of forearm without complication, right, initial encounter    3. Need for diphtheria-tetanus-pertussis (Tdap) vaccine        Plan:   Forearm covered with mupirocin and nonstick bandages to all open wounds and then loosely wrapped with Ace wrap.  Patient tolerated procedure well.    Discussed ongoing care with patient and her son who was present.  Patient will keep lesions covered for the next 5 days with 1-3 dressing changes daily.  After 5 days if lesions are beginning to scab patient will leave them open air.  The patient and family member acknowledged understanding of these instructions.  If there are any changes or signs of infection patient will return to this clinic or to her primary care provider for follow-up.    Cat scratch  -     (In Office Administered) Tdap Vaccine  -     amoxicillin-clavulanate 875-125mg (AUGMENTIN) 875-125 mg per tablet; Take 1 tablet by mouth 2 (two) times daily. for 7 days  Dispense: 14 tablet; Refill: 0  -     mupirocin (BACTROBAN) 2 % ointment; Apply to affected area 3 times daily  Dispense: 22 g; Refill: 0    Skin tear of forearm without complication, right, initial encounter  -     (In Office Administered) Tdap Vaccine  -     amoxicillin-clavulanate 875-125mg (AUGMENTIN) 875-125 mg per tablet; Take 1 tablet by mouth 2 (two) times daily. for 7 days  Dispense: 14 tablet; Refill: 0  -     mupirocin (BACTROBAN) 2 % ointment; Apply to affected area 3 times daily  Dispense: 22 g; Refill: 0    Need for diphtheria-tetanus-pertussis (Tdap) vaccine  -     (In Office Administered) Tdap Vaccine      Patient  Instructions   Please observe the wound at each dressing change.  If there are any changes to the lesions such as; spreading redness, fever or pus, or if you develop fever, please return to this clinic or your primary care provider for further evaluation.

## 2024-02-14 NOTE — PROCEDURES
"Laceration Repair    Date/Time: 2024 3:00 PM    Performed by: Karthik Flores NP  Authorized by: Karthik Flores NP  Consent Done: Yes  Consent: Verbal consent obtained.  Risks and benefits: risks, benefits and alternatives were discussed  Consent given by: patient  Patient understanding: patient states understanding of the procedure being performed  Patient consent: the patient's understanding of the procedure matches consent given  Procedure consent: procedure consent matches procedure scheduled  Patient identity confirmed: name and   Time out: Immediately prior to procedure a "time out" was called to verify the correct patient, procedure, equipment, support staff and site/side marked as required.  Body area: upper extremity  Location details: right upper arm  Laceration length: 3 cm  Tendon involvement: none  Nerve involvement: none  Vascular damage: no  Anesthesia: see MAR for details (none)  Preparation: Patient was prepped and draped in the usual sterile fashion.  Irrigation solution: saline  Irrigation method: syringe  Amount of cleaning: standard  Skin closure: Steri-Strips  Technique: simple  Approximation: loose  Approximation difficulty: simple  Patient tolerance: Patient tolerated the procedure well with no immediate complications  Comments: Single Steri-Strips applied to loosely fold back skin tear and then covered with a clear Tegaderm.  Patient tolerated procedure well.        "

## 2024-03-14 ENCOUNTER — OFFICE VISIT (OUTPATIENT)
Dept: FAMILY MEDICINE | Facility: CLINIC | Age: 89
End: 2024-03-14
Payer: MEDICARE

## 2024-03-14 ENCOUNTER — LAB VISIT (OUTPATIENT)
Dept: LAB | Facility: HOSPITAL | Age: 89
End: 2024-03-14
Attending: FAMILY MEDICINE
Payer: MEDICARE

## 2024-03-14 VITALS
OXYGEN SATURATION: 96 % | BODY MASS INDEX: 20.46 KG/M2 | DIASTOLIC BLOOD PRESSURE: 70 MMHG | HEIGHT: 60 IN | RESPIRATION RATE: 20 BRPM | HEART RATE: 70 BPM | SYSTOLIC BLOOD PRESSURE: 152 MMHG | WEIGHT: 104.19 LBS

## 2024-03-14 DIAGNOSIS — C50.812 MALIGNANT NEOPLASM OF OVERLAPPING SITES OF LEFT BREAST IN FEMALE, ESTROGEN RECEPTOR POSITIVE: ICD-10-CM

## 2024-03-14 DIAGNOSIS — I10 PRIMARY HYPERTENSION: ICD-10-CM

## 2024-03-14 DIAGNOSIS — Z17.0 MALIGNANT NEOPLASM OF OVERLAPPING SITES OF LEFT BREAST IN FEMALE, ESTROGEN RECEPTOR POSITIVE: ICD-10-CM

## 2024-03-14 DIAGNOSIS — M81.0 AGE-RELATED OSTEOPOROSIS WITHOUT CURRENT PATHOLOGICAL FRACTURE: ICD-10-CM

## 2024-03-14 DIAGNOSIS — F41.8 ANXIETY WITH DEPRESSION: ICD-10-CM

## 2024-03-14 DIAGNOSIS — E55.9 VITAMIN D DEFICIENCY: ICD-10-CM

## 2024-03-14 DIAGNOSIS — D50.8 IRON DEFICIENCY ANEMIA SECONDARY TO INADEQUATE DIETARY IRON INTAKE: ICD-10-CM

## 2024-03-14 DIAGNOSIS — K21.9 GASTROESOPHAGEAL REFLUX DISEASE, UNSPECIFIED WHETHER ESOPHAGITIS PRESENT: ICD-10-CM

## 2024-03-14 DIAGNOSIS — E78.49 OTHER HYPERLIPIDEMIA: ICD-10-CM

## 2024-03-14 DIAGNOSIS — Z79.899 DRUG THERAPY: ICD-10-CM

## 2024-03-14 DIAGNOSIS — Z23 IMMUNIZATION DUE: ICD-10-CM

## 2024-03-14 DIAGNOSIS — R73.01 IMPAIRED FASTING GLUCOSE: ICD-10-CM
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
## 2024-03-14 DIAGNOSIS — F03.90 SENILE DEMENTIA WITHOUT BEHAVIORAL DISTURBANCE: ICD-10-CM

## 2024-03-14 DIAGNOSIS — S51.811A SKIN TEAR OF FOREARM WITHOUT COMPLICATION, RIGHT, INITIAL ENCOUNTER: ICD-10-CM

## 2024-03-14 DIAGNOSIS — R41.89 COGNITIVE IMPAIRMENT: Primary | ICD-10-CM

## 2024-03-14 LAB
ALBUMIN SERPL BCP-MCNC: 3.9 G/DL (ref 3.5–5.2)
ALP SERPL-CCNC: 99 U/L (ref 55–135)
ALT SERPL W/O P-5'-P-CCNC: 9 U/L (ref 10–44)
ANION GAP SERPL CALC-SCNC: 14 MMOL/L (ref 8–16)
AST SERPL-CCNC: 17 U/L (ref 10–40)
BASOPHILS # BLD AUTO: 0.07 K/UL (ref 0–0.2)
BASOPHILS NFR BLD: 0.9 % (ref 0–1.9)
BILIRUB SERPL-MCNC: 0.6 MG/DL (ref 0.1–1)
BUN SERPL-MCNC: 16 MG/DL (ref 10–30)
CALCIUM SERPL-MCNC: 10 MG/DL (ref 8.7–10.5)
CHLORIDE SERPL-SCNC: 105 MMOL/L (ref 95–110)
CHOLEST SERPL-MCNC: 199 MG/DL (ref 120–199)
CHOLEST/HDLC SERPL: 2.7 {RATIO} (ref 2–5)
CO2 SERPL-SCNC: 22 MMOL/L (ref 23–29)
CREAT SERPL-MCNC: 0.8 MG/DL (ref 0.5–1.4)
DIFFERENTIAL METHOD BLD: ABNORMAL
EOSINOPHIL # BLD AUTO: 0 K/UL (ref 0–0.5)
EOSINOPHIL NFR BLD: 0.1 % (ref 0–8)
ERYTHROCYTE [DISTWIDTH] IN BLOOD BY AUTOMATED COUNT: 17.3 % (ref 11.5–14.5)
EST. GFR  (NO RACE VARIABLE): >60 ML/MIN/1.73 M^2
ESTIMATED AVG GLUCOSE: 120 MG/DL (ref 68–131)
GLUCOSE SERPL-MCNC: 118 MG/DL (ref 70–110)
HBA1C MFR BLD: 5.8 % (ref 4–5.6)
HCT VFR BLD AUTO: 28.5 % (ref 37–48.5)
HDLC SERPL-MCNC: 73 MG/DL (ref 40–75)
HDLC SERPL: 36.7 % (ref 20–50)
HGB BLD-MCNC: 8.3 G/DL (ref 12–16)
IMM GRANULOCYTES # BLD AUTO: 0.02 K/UL (ref 0–0.04)
IMM GRANULOCYTES NFR BLD AUTO: 0.2 % (ref 0–0.5)
LDLC SERPL CALC-MCNC: 109.8 MG/DL (ref 63–159)
LYMPHOCYTES # BLD AUTO: 1.5 K/UL (ref 1–4.8)
LYMPHOCYTES NFR BLD: 18.6 % (ref 18–48)
MCH RBC QN AUTO: 20.4 PG (ref 27–31)
MCHC RBC AUTO-ENTMCNC: 29.1 G/DL (ref 32–36)
MCV RBC AUTO: 70 FL (ref 82–98)
MONOCYTES # BLD AUTO: 0.5 K/UL (ref 0.3–1)
MONOCYTES NFR BLD: 6.4 % (ref 4–15)
NEUTROPHILS # BLD AUTO: 6 K/UL (ref 1.8–7.7)
NEUTROPHILS NFR BLD: 73.8 % (ref 38–73)
NONHDLC SERPL-MCNC: 126 MG/DL
NRBC BLD-RTO: 0 /100 WBC
PLATELET # BLD AUTO: 415 K/UL (ref 150–450)
PMV BLD AUTO: 10.6 FL (ref 9.2–12.9)
POTASSIUM SERPL-SCNC: 4.1 MMOL/L (ref 3.5–5.1)
PROT SERPL-MCNC: 7.3 G/DL (ref 6–8.4)
RBC # BLD AUTO: 4.07 M/UL (ref 4–5.4)
SODIUM SERPL-SCNC: 141 MMOL/L (ref 136–145)
T4 FREE SERPL-MCNC: 1.19 NG/DL (ref 0.71–1.51)
TRIGL SERPL-MCNC: 81 MG/DL (ref 30–150)
TSH SERPL DL<=0.005 MIU/L-ACNC: 0.41 UIU/ML (ref 0.4–4)
WBC # BLD AUTO: 8.14 K/UL (ref 3.9–12.7)

## 2024-03-14 PROCEDURE — 84439 ASSAY OF FREE THYROXINE: CPT | Mod: HCNC | Performed by: FAMILY MEDICINE

## 2024-03-14 PROCEDURE — 80053 COMPREHEN METABOLIC PANEL: CPT | Mod: HCNC | Performed by: FAMILY MEDICINE

## 2024-03-14 PROCEDURE — 36415 COLL VENOUS BLD VENIPUNCTURE: CPT | Mod: HCNC,PN | Performed by: FAMILY MEDICINE

## 2024-03-14 PROCEDURE — 84443 ASSAY THYROID STIM HORMONE: CPT | Mod: HCNC | Performed by: FAMILY MEDICINE

## 2024-03-14 PROCEDURE — 99999 PR PBB SHADOW E&M-EST. PATIENT-LVL III: CPT | Mod: PBBFAC,HCNC,, | Performed by: FAMILY MEDICINE

## 2024-03-14 PROCEDURE — 1159F MED LIST DOCD IN RCRD: CPT | Mod: HCNC,CPTII,S$GLB, | Performed by: FAMILY MEDICINE

## 2024-03-14 PROCEDURE — 1126F AMNT PAIN NOTED NONE PRSNT: CPT | Mod: HCNC,CPTII,S$GLB, | Performed by: FAMILY MEDICINE

## 2024-03-14 PROCEDURE — 85025 COMPLETE CBC W/AUTO DIFF WBC: CPT | Mod: HCNC | Performed by: FAMILY MEDICINE

## 2024-03-14 PROCEDURE — 80061 LIPID PANEL: CPT | Mod: HCNC | Performed by: FAMILY MEDICINE

## 2024-03-14 PROCEDURE — 3288F FALL RISK ASSESSMENT DOCD: CPT | Mod: HCNC,CPTII,S$GLB, | Performed by: FAMILY MEDICINE

## 2024-03-14 PROCEDURE — 99214 OFFICE O/P EST MOD 30 MIN: CPT | Mod: HCNC,S$GLB,, | Performed by: FAMILY MEDICINE

## 2024-03-14 PROCEDURE — G2211 COMPLEX E/M VISIT ADD ON: HCPCS | Mod: HCNC,S$GLB,, | Performed by: FAMILY MEDICINE

## 2024-03-14 PROCEDURE — 83036 HEMOGLOBIN GLYCOSYLATED A1C: CPT | Mod: HCNC | Performed by: FAMILY MEDICINE

## 2024-03-14 PROCEDURE — 1100F PTFALLS ASSESS-DOCD GE2>/YR: CPT | Mod: HCNC,CPTII,S$GLB, | Performed by: FAMILY MEDICINE

## 2024-03-14 RX ORDER — MUPIROCIN 20 MG/G
OINTMENT TOPICAL
Qty: 22 G | Refills: 2 | Status: SHIPPED | OUTPATIENT
Start: 2024-03-14

## 2024-03-14 NOTE — PROGRESS NOTES
THIS DOCUMENT WAS MADE IN PART WITH VOICE RECOGNITION SOFTWARE.  OCCASIONALLY THIS SOFTWARE WILL MISINTERPRET WORDS OR PHRASES.    Assessment and Plan:    1. Cognitive impairment        2. Senile dementia without behavioral disturbance        3. Anxiety with depression        4. Other hyperlipidemia        5. Primary hypertension        6. Iron deficiency anemia secondary to inadequate dietary iron intake        7. Vitamin D deficiency        8. Age-related osteoporosis without current pathological fracture        9. Impaired fasting glucose        10. Gastroesophageal reflux disease, unspecified whether esophagitis present        11. Malignant neoplasm of overlapping sites of left breast in female, estrogen receptor positive  Ambulatory referral/consult to Hospice      12. Drug therapy  CBC Auto Differential    Comprehensive Metabolic Panel    Lipid Panel    Hemoglobin A1C    TSH    T4, Free    Urinalysis    Urinalysis Microscopic      13. Immunization due        14. Cat scratch  mupirocin (BACTROBAN) 2 % ointment      15. Skin tear of forearm without complication, right, initial encounter  mupirocin (BACTROBAN) 2 % ointment           Counseled on immunizations    Labs today     Chronic conditions reviewed      Referred to hospice for diagnosis stage IV breast cancer, Alzheimer's dementia     [phone] : Felicita  (daughter)    Visit today included increased complexity associated with the care of the episodic problem noted above addressed and managing the longitudinal care of the patient due to the serious and/or complex managed problem(s) .      ______________________________________________________________________  Subjective:    Chief Complaint:  Chief Complaint   Patient presents with    Establish Care        HPI:  Margarita is a 89 y.o. year old     Patient presents today to establish care, see bolded text below      Fairly significant dementia, lives alone and refuses nursing home.  Has some kids that live  somewhat close by, have a camera system in place.  Patient not too crazy about the idea of people coming to her house for home health or hospice     Has stage IV breast cancer.  No treatment plans moving forward    History breast cancer, left side  Stage 4, Active   Denies pain ; has open wound at primary site   No current plans for chemo / radiation   Oncology : MD Marizol    Alzheimer's dementia/ cognitive impairment   Wants to live alone  Family has cameras in house       Anxiety, depression     Hypertension     Hypothyroidism     Hyperlipidemia     Osteoporosis   High fall risk     GERD/history of peptic ulcer disease / GI bleed req transfusion  Rx-omeprazole 20 mg    Vitamin-D deficiency               Past Medical History:  Past Medical History:   Diagnosis Date    Anxiety     Depression     Hypertension     Hypothyroidism     Iron deficiency anemia     due to blood loss    Osteoporosis     PUD (peptic ulcer disease) 11/2016    required 2 units pRBC's due to bleeding; [facility]; GI/Dr Jamison.    Pure hypercholesterolemia     Thyroid disease     Vitamin D deficiency        Past Surgical History:  Past Surgical History:   Procedure Laterality Date    APPENDECTOMY      HYSTERECTOMY      thryroid         Family History:  No family history on file.    Social History:  Social History     Socioeconomic History    Marital status:    Tobacco Use    Smoking status: Former    Smokeless tobacco: Never       Medications:  Current Outpatient Medications on File Prior to Visit   Medication Sig Dispense Refill    MULTIVIT-IRON-MIN-FOLIC ACID 3,500-18-0.4 UNIT-MG-MG ORAL CHEW Take by mouth once daily.      vitamin D 1000 units Tab Take 1,000 mg by mouth once daily.       [DISCONTINUED] diphenhydrAMINE-acetaminophen (TYLENOL PM)  mg Tab Take 1 tablet by mouth nightly as needed.      calcium carbonate (OS-MARGARITA) 600 mg (1,500 mg) Tab Take 600 mg by mouth 2 (two) times daily with meals.      [DISCONTINUED] amLODIPine  (NORVASC) 2.5 MG tablet TAKE 1 TABLET (2.5 MG TOTAL) BY MOUTH ONCE DAILY. GENERIC (Patient not taking: Reported on 2/14/2024) 90 tablet 3    [DISCONTINUED] amoxicillin-clavulanate 875-125mg (AUGMENTIN) 875-125 mg per tablet Take 1 tablet by mouth 2 (two) times daily. for 7 days 14 tablet 0    [DISCONTINUED] busPIRone (BUSPAR) 5 MG Tab 5 mg po TID as needed for anxiety (Patient not taking: Reported on 2/23/2023) 50 tablet 1    [DISCONTINUED] mupirocin (BACTROBAN) 2 % ointment Apply to affected area 3 times daily (Patient not taking: Reported on 3/14/2024) 22 g 0    [DISCONTINUED] omeprazole (PRILOSEC) 20 MG capsule Take 20 mg by mouth once daily.       No current facility-administered medications on file prior to visit.       Allergies:  Patient has no known allergies.    Immunizations:  Immunization History   Administered Date(s) Administered    COVID-19, MRNA, LN-S, PF (Pfizer) (Purple Cap) 01/27/2021, 02/17/2021    Influenza (FLUAD) - Trivalent - Adjuvanted - PF (65+) 10/22/2019    Influenza - High Dose - PF (65 years and older) 10/08/2015, 10/16/2016, 10/03/2017, 10/02/2018    Influenza Split 10/28/2014    Tdap 02/14/2024       Review of Systems:  Review of Systems   All other systems reviewed and are negative.      Objective:    Vitals:  Vitals:    03/14/24 1106   BP: (!) 152/70   Pulse: 70   Resp: 20   SpO2: 96%   Weight: 47.3 kg (104 lb 2.7 oz)   Height: 5' (1.524 m)   PainSc: 0-No pain       Physical Exam  Vitals reviewed.   Constitutional:       General: She is not in acute distress.  HENT:      Head: Normocephalic and atraumatic.   Eyes:      Pupils: Pupils are equal, round, and reactive to light.   Cardiovascular:      Rate and Rhythm: Normal rate and regular rhythm.      Heart sounds: No murmur heard.     No friction rub.   Pulmonary:      Effort: Pulmonary effort is normal.      Breath sounds: Normal breath sounds.   Abdominal:      General: Bowel sounds are normal. There is no distension.       Palpations: Abdomen is soft.      Tenderness: There is no abdominal tenderness.   Musculoskeletal:      Cervical back: Neck supple.   Skin:     General: Skin is warm and dry.      Findings: No rash.   Psychiatric:         Behavior: Behavior normal.             Bayron Pendleton MD  Family Medicine

## 2024-03-19 ENCOUNTER — TELEPHONE (OUTPATIENT)
Dept: FAMILY MEDICINE | Facility: CLINIC | Age: 89
End: 2024-03-19
Payer: MEDICARE

## 2024-03-19 NOTE — TELEPHONE ENCOUNTER
----- Message from Greysonernestine Daniele sent at 3/19/2024  8:14 AM CDT -----  Type: Needs Medical Advice  Who Called:  Toshia perry [facility] Call Back Number: [phone]  Additional Information: Toshia is calling the office to have some medical records sent over. They need the history of physicals sent over to go with the referral. Please fax to [phone]. Please call back to advise. Thanks!

## 2024-08-12 ENCOUNTER — TELEPHONE (OUTPATIENT)
Dept: FAMILY MEDICINE | Facility: CLINIC | Age: 89
End: 2024-08-12
Payer: MEDICARE

## 2024-08-12 NOTE — TELEPHONE ENCOUNTER
Return pt daughter phone call, hospice does not know about her symptom. Pt was not taken to ED or urgent care for this.

## 2024-08-12 NOTE — TELEPHONE ENCOUNTER
----- Message from Radha Vargas sent at 8/12/2024 12:32 PM CDT -----  Regarding: Needs return call  Type: Needs Medical Advice  Who Called:  Pt daughter    Best Call Back Number: [phone]    Additional Information: Pt daughter states pt was just discharged from hospice 2 weeks ago, pt feels like her tongue is burning.. pt is not wanting to eat and her tongue is red and they think its a vitamin defenciency. They wants to talk to someone regarding this asap

## 2024-08-13 ENCOUNTER — OFFICE VISIT (OUTPATIENT)
Dept: FAMILY MEDICINE | Facility: CLINIC | Age: 89
End: 2024-08-13
Payer: MEDICARE

## 2024-08-13 ENCOUNTER — LAB VISIT (OUTPATIENT)
Dept: LAB | Facility: HOSPITAL | Age: 89
End: 2024-08-13
Attending: NURSE PRACTITIONER
Payer: MEDICARE

## 2024-08-13 VITALS
HEIGHT: 60 IN | WEIGHT: 109 LBS | OXYGEN SATURATION: 99 % | HEART RATE: 78 BPM | BODY MASS INDEX: 21.4 KG/M2 | SYSTOLIC BLOOD PRESSURE: 154 MMHG | DIASTOLIC BLOOD PRESSURE: 66 MMHG

## 2024-08-13 DIAGNOSIS — B37.0 ORAL THRUSH: ICD-10-CM

## 2024-08-13 DIAGNOSIS — F03.90 SENILE DEMENTIA WITHOUT BEHAVIORAL DISTURBANCE: ICD-10-CM

## 2024-08-13 DIAGNOSIS — Z17.0 MALIGNANT NEOPLASM OF OVERLAPPING SITES OF LEFT BREAST IN FEMALE, ESTROGEN RECEPTOR POSITIVE: ICD-10-CM

## 2024-08-13 DIAGNOSIS — Z79.899 DRUG THERAPY: ICD-10-CM

## 2024-08-13 DIAGNOSIS — C50.812 MALIGNANT NEOPLASM OF OVERLAPPING SITES OF LEFT BREAST IN FEMALE, ESTROGEN RECEPTOR POSITIVE: ICD-10-CM

## 2024-08-13 DIAGNOSIS — D50.8 IRON DEFICIENCY ANEMIA SECONDARY TO INADEQUATE DIETARY IRON INTAKE: ICD-10-CM

## 2024-08-13 DIAGNOSIS — R79.89 DECREASED THYROID STIMULATING HORMONE (TSH) LEVEL: ICD-10-CM

## 2024-08-13 DIAGNOSIS — I10 PRIMARY HYPERTENSION: Primary | ICD-10-CM

## 2024-08-13 DIAGNOSIS — K14.6 TONGUE BURNING SENSATION: ICD-10-CM

## 2024-08-13 LAB
ALBUMIN SERPL BCP-MCNC: 3.7 G/DL (ref 3.5–5.2)
ALP SERPL-CCNC: 82 U/L (ref 55–135)
ALT SERPL W/O P-5'-P-CCNC: 5 U/L (ref 10–44)
ANION GAP SERPL CALC-SCNC: 12 MMOL/L (ref 8–16)
AST SERPL-CCNC: 13 U/L (ref 10–40)
BILIRUB SERPL-MCNC: 0.4 MG/DL (ref 0.1–1)
BUN SERPL-MCNC: 12 MG/DL (ref 10–30)
CALCIUM SERPL-MCNC: 9.9 MG/DL (ref 8.7–10.5)
CHLORIDE SERPL-SCNC: 107 MMOL/L (ref 95–110)
CO2 SERPL-SCNC: 21 MMOL/L (ref 23–29)
CREAT SERPL-MCNC: 0.9 MG/DL (ref 0.5–1.4)
ERYTHROCYTE [DISTWIDTH] IN BLOOD BY AUTOMATED COUNT: 22.6 % (ref 11.5–14.5)
EST. GFR  (NO RACE VARIABLE): 58.5 ML/MIN/1.73 M^2
FERRITIN SERPL-MCNC: 5 NG/ML (ref 20–300)
FOLATE SERPL-MCNC: 9.4 NG/ML (ref 4–24)
GLUCOSE SERPL-MCNC: 139 MG/DL (ref 70–110)
HCT VFR BLD AUTO: 28 % (ref 37–48.5)
HGB BLD-MCNC: 7.3 G/DL (ref 12–16)
IRON SERPL-MCNC: <10 UG/DL (ref 30–160)
MCH RBC QN AUTO: 16.2 PG (ref 27–31)
MCHC RBC AUTO-ENTMCNC: 26.1 G/DL (ref 32–36)
MCV RBC AUTO: 62 FL (ref 82–98)
PLATELET # BLD AUTO: 540 K/UL (ref 150–450)
PMV BLD AUTO: 10 FL (ref 9.2–12.9)
POTASSIUM SERPL-SCNC: 4.3 MMOL/L (ref 3.5–5.1)
PROT SERPL-MCNC: 6.9 G/DL (ref 6–8.4)
RBC # BLD AUTO: 4.52 M/UL (ref 4–5.4)
SATURATED IRON: ABNORMAL % (ref 20–50)
SODIUM SERPL-SCNC: 140 MMOL/L (ref 136–145)
T4 FREE SERPL-MCNC: 0.99 NG/DL (ref 0.71–1.51)
TOTAL IRON BINDING CAPACITY: 505 UG/DL (ref 250–450)
TRANSFERRIN SERPL-MCNC: 341 MG/DL (ref 200–375)
TSH SERPL DL<=0.005 MIU/L-ACNC: 0.43 UIU/ML (ref 0.4–4)
VIT B12 SERPL-MCNC: 1241 PG/ML (ref 210–950)
WBC # BLD AUTO: 9.81 K/UL (ref 3.9–12.7)

## 2024-08-13 PROCEDURE — 82728 ASSAY OF FERRITIN: CPT | Mod: HCNC | Performed by: NURSE PRACTITIONER

## 2024-08-13 PROCEDURE — 84443 ASSAY THYROID STIM HORMONE: CPT | Mod: HCNC | Performed by: NURSE PRACTITIONER

## 2024-08-13 PROCEDURE — 82607 VITAMIN B-12: CPT | Mod: HCNC | Performed by: NURSE PRACTITIONER

## 2024-08-13 PROCEDURE — 99999 PR PBB SHADOW E&M-EST. PATIENT-LVL IV: CPT | Mod: PBBFAC,HCNC,, | Performed by: NURSE PRACTITIONER

## 2024-08-13 PROCEDURE — 36415 COLL VENOUS BLD VENIPUNCTURE: CPT | Mod: HCNC,PN | Performed by: NURSE PRACTITIONER

## 2024-08-13 PROCEDURE — 84439 ASSAY OF FREE THYROXINE: CPT | Mod: HCNC | Performed by: NURSE PRACTITIONER

## 2024-08-13 PROCEDURE — 83540 ASSAY OF IRON: CPT | Mod: HCNC | Performed by: NURSE PRACTITIONER

## 2024-08-13 PROCEDURE — 85027 COMPLETE CBC AUTOMATED: CPT | Mod: HCNC | Performed by: NURSE PRACTITIONER

## 2024-08-13 PROCEDURE — 82746 ASSAY OF FOLIC ACID SERUM: CPT | Mod: HCNC | Performed by: NURSE PRACTITIONER

## 2024-08-13 PROCEDURE — 80053 COMPREHEN METABOLIC PANEL: CPT | Mod: HCNC | Performed by: NURSE PRACTITIONER

## 2024-08-13 RX ORDER — AMLODIPINE BESYLATE 10 MG/1
10 TABLET ORAL DAILY
Qty: 90 TABLET | Refills: 1 | Status: SHIPPED | OUTPATIENT
Start: 2024-08-13 | End: 2025-02-09

## 2024-08-13 RX ORDER — AMLODIPINE BESYLATE 10 MG/1
10 TABLET ORAL DAILY
COMMUNITY
Start: 2024-07-26 | End: 2024-08-13 | Stop reason: SDUPTHER

## 2024-08-13 RX ORDER — NYSTATIN 100000 [USP'U]/ML
6 SUSPENSION ORAL 4 TIMES DAILY
Qty: 240 ML | Refills: 0 | Status: SHIPPED | OUTPATIENT
Start: 2024-08-13 | End: 2024-08-23

## 2024-08-13 NOTE — PROGRESS NOTES
THIS DOCUMENT WAS MADE IN PART WITH VOICE RECOGNITION SOFTWARE.  OCCASIONALLY THIS SOFTWARE WILL MISINTERPRET WORDS OR PHRASES.     Assessment and Plan:    Primary hypertension  Comments:  elevated today in clinic    advised taking amlodipine daily as prescribed  Orders:  -     amLODIPine (NORVASC) 10 MG tablet; Take 1 tablet (10 mg total) by mouth once daily.  Dispense: 90 tablet; Refill: 1    Oral thrush  Comments:  advised on oral care and nystatin swish and swallow  Orders:  -     nystatin (MYCOSTATIN) 100,000 unit/mL suspension; Take 6 mLs (600,000 Units total) by mouth 4 (four) times daily. for 10 days  Dispense: 240 mL; Refill: 0    Senile dementia without behavioral disturbance    Iron deficiency anemia secondary to inadequate dietary iron intake  -     CBC Without Differential; Future; Expected date: 08/13/2024  -     Ferritin; Future; Expected date: 08/13/2024  -     Iron and TIBC; Future; Expected date: 08/13/2024  -     Vitamin B12; Future; Expected date: 08/13/2024  -     Folate; Future; Expected date: 08/13/2024    Malignant neoplasm of overlapping sites of left breast in female, estrogen receptor positive  Comments:  stable    Tongue burning sensation  -     CBC Without Differential; Future; Expected date: 08/13/2024  -     Comprehensive Metabolic Panel; Future; Expected date: 08/13/2024    Decreased thyroid stimulating hormone (TSH) level  -     TSH; Future; Expected date: 08/13/2024  -     T4, Free; Future; Expected date: 08/13/2024    Drug therapy  -     CBC Without Differential; Future; Expected date: 08/13/2024  -     Comprehensive Metabolic Panel; Future; Expected date: 08/13/2024  -     TSH; Future; Expected date: 08/13/2024  -     T4, Free; Future; Expected date: 08/13/2024  -     Ferritin; Future; Expected date: 08/13/2024  -     Iron and TIBC; Future; Expected date: 08/13/2024  -     Vitamin B12; Future; Expected date: 08/13/2024  -     Folate; Future; Expected date: 08/13/2024              Visit summary:     Chronic conditions reviewed     Labs ordered today.     Advised on oral care and  nystatin rinse.      Recommend patient take amlodipine daily-  daughter will help with this.      Emergent conditions discussed.      Follow up in about 6 months (around 2/13/2025) for Annual with PCP.   ______________________________________________________________________  Subjective:    Chief Complaint:   Tongue pain    HPI:  Margarita is a 89 y.o. year old female here  concerned regarding burning sensation to tongue,   1st noticed approx 1 mth ago.  More noticeable when she eats.  Daughter noticed tongue looks red,  patchy.     Patient has been on hospice for stage IV breast cancer, discharged from hospice 3 weeks ago.  Daughter reports that she patient met all her goals. Patient's daughter reports that she doesn't take any of her meds- will give her  amlodipine if they check her  blood pressure and its elevated.  Living alone,  family checks in on her frequently-  cameras in the home.         History breast cancer, left side  Stage 4, Active   Denies pain ; has open wound at primary site   No current plans for chemo / radiation   Oncology : MD Marizol     Alzheimer's dementia/ cognitive impairment   Wants to live alone  Family has cameras in house         Hypertension :  Amlodipine 10 mg- doesn't take  regularly.      Hypothyroidism      Hyperlipidemia      Osteoporosis   High fall risk      GERD/history of peptic ulcer disease / GI bleed req transfusion  Rx-omeprazole 20 mg as needed     Vitamin-D deficiency    Medications:  Current Outpatient Medications on File Prior to Visit   Medication Sig Dispense Refill    [DISCONTINUED] amLODIPine (NORVASC) 10 MG tablet Take 10 mg by mouth once daily.      calcium carbonate (OS-MARGARITA) 600 mg (1,500 mg) Tab Take 600 mg by mouth 2 (two) times daily with meals. (Patient not taking: Reported on 8/13/2024)      MULTIVIT-IRON-MIN-FOLIC ACID 3,500-18-0.4 UNIT-MG-MG ORAL  CHEW Take by mouth once daily. (Patient not taking: Reported on 8/13/2024)      mupirocin (BACTROBAN) 2 % ointment Apply to affected area 3 times daily (Patient not taking: Reported on 8/13/2024) 22 g 2    vitamin D 1000 units Tab Take 1,000 mg by mouth once daily.  (Patient not taking: Reported on 8/13/2024)       No current facility-administered medications on file prior to visit.       Review of Systems:  Review of Systems   All other systems reviewed and are negative.      Past Medical History:  Past Medical History:   Diagnosis Date    Anxiety     Depression     Hypertension     Hypothyroidism     Iron deficiency anemia     due to blood loss    Osteoporosis     PUD (peptic ulcer disease) 11/2016    [facility]BC[facility]Dr Jamison.    Pure hypercholesterolemia     Thyroid disease     Vitamin D deficiency        Objective:    Vitals:  Vitals:    08/13/24 1047   BP: (!) 160/60   Pulse: 78   SpO2: 99%   Weight: 49.5 kg (109 lb 0.3 oz)   Height: 5' (1.524 m)   PainSc: 0-No pain       Physical Exam  Vitals reviewed.   Constitutional:       General: She is not in acute distress.  HENT:      Head: Normocephalic and atraumatic.      Mouth/Throat:      Mouth: Mucous membranes are moist.      Pharynx: Oropharynx is clear.      Comments:  Erythematous patches noted to left anterior aspect tongue  Eyes:      Pupils: Pupils are equal, round, and reactive to light.   Cardiovascular:      Rate and Rhythm: Normal rate and regular rhythm.      Heart sounds: No murmur heard.     No friction rub.   Pulmonary:      Effort: Pulmonary effort is normal.      Breath sounds: Normal breath sounds.   Abdominal:      General: Bowel sounds are normal. There is no distension.      Palpations: Abdomen is soft.      Tenderness: There is no abdominal tenderness.   Musculoskeletal:      Cervical back: Neck supple.      Right lower leg: No edema.      Left lower leg: No edema.   Skin:     General: Skin is warm and  dry.      Findings: No rash.   Neurological:      Mental Status: She is alert. Mental status is at baseline.   Psychiatric:         Behavior: Behavior normal.         Data:    CMP  Sodium   Date Value Ref Range Status   03/14/2024 141 136 - 145 mmol/L Final     Potassium   Date Value Ref Range Status   03/14/2024 4.1 3.5 - 5.1 mmol/L Final     Chloride   Date Value Ref Range Status   03/14/2024 105 95 - 110 mmol/L Final     CO2   Date Value Ref Range Status   03/14/2024 22 (L) 23 - 29 mmol/L Final     Glucose   Date Value Ref Range Status   03/14/2024 118 (H) 70 - 110 mg/dL Final     BUN   Date Value Ref Range Status   03/14/2024 16 10 - 30 mg/dL Final     Creatinine   Date Value Ref Range Status   03/14/2024 0.8 0.5 - 1.4 mg/dL Final     Calcium   Date Value Ref Range Status   03/14/2024 10.0 8.7 - 10.5 mg/dL Final     Total Protein   Date Value Ref Range Status   03/14/2024 7.3 6.0 - 8.4 g/dL Final     Albumin   Date Value Ref Range Status   03/14/2024 3.9 3.5 - 5.2 g/dL Final     Total Bilirubin   Date Value Ref Range Status   03/14/2024 0.6 0.1 - 1.0 mg/dL Final     Comment:     For infants and newborns, interpretation of results should be based  on gestational age, weight and in agreement with clinical  observations.    Premature Infant recommended reference ranges:  Up to 24 hours.............<8.0 mg/dL  Up to 48 hours............<12.0 mg/dL  3-5 days..................<15.0 mg/dL  6-29 days.................<15.0 mg/dL       Alkaline Phosphatase   Date Value Ref Range Status   03/14/2024 99 55 - 135 U/L Final     AST   Date Value Ref Range Status   03/14/2024 17 10 - 40 U/L Final     ALT   Date Value Ref Range Status   03/14/2024 9 (L) 10 - 44 U/L Final     Anion Gap   Date Value Ref Range Status   03/14/2024 14 8 - 16 mmol/L Final     eGFR   Date Value Ref Range Status   03/14/2024 >60.0 >60 mL/min/1.73 m^2 Final        Medical history reviewed, Medications reconciled.          FARAZ Torres  Family  Medicine

## 2024-08-16 ENCOUNTER — TELEPHONE (OUTPATIENT)
Dept: FAMILY MEDICINE | Facility: CLINIC | Age: 89
End: 2024-08-16
Payer: MEDICARE

## 2024-08-16 NOTE — TELEPHONE ENCOUNTER
----- Message from GRISELDA Torres sent at 8/16/2024  8:38 AM CDT -----    Blood count is fairly low, I would consider taking a daily iron supplement to help with this.  Likely related to the cancer diagnosis.  If you are interested in treatment of this, you may reach out to your oncologist who could consider iron infusions   if the patient wanted it and the oncologist thought it appropriate.

## 2024-08-16 NOTE — TELEPHONE ENCOUNTER
Called pt to let her know what NP Thuy said. Her daughter Felicita, said her mother no longer has an oncologist but she actually went ahead and got iron pills for her mother. Told pt's daughter ok. Pt said thank you for everything and hung up.

## 2024-08-16 NOTE — PROGRESS NOTES
Blood count is fairly low, I would consider taking a daily iron supplement to help with this.  Likely related to the cancer diagnosis.  If you are interested in treatment of this, you may reach out to your oncologist who could consider iron infusions if the patient wanted it and the oncologist thought it appropriate.

## 2024-10-31 ENCOUNTER — OFFICE VISIT (OUTPATIENT)
Dept: FAMILY MEDICINE | Facility: CLINIC | Age: 89
End: 2024-10-31
Payer: MEDICARE

## 2024-10-31 ENCOUNTER — LAB VISIT (OUTPATIENT)
Dept: LAB | Facility: HOSPITAL | Age: 89
End: 2024-10-31
Attending: FAMILY MEDICINE
Payer: MEDICARE

## 2024-10-31 VITALS
SYSTOLIC BLOOD PRESSURE: 136 MMHG | RESPIRATION RATE: 16 BRPM | WEIGHT: 113.56 LBS | DIASTOLIC BLOOD PRESSURE: 78 MMHG | HEIGHT: 60 IN | BODY MASS INDEX: 22.29 KG/M2

## 2024-10-31 DIAGNOSIS — L98.9 SKIN LESION: ICD-10-CM

## 2024-10-31 DIAGNOSIS — C50.812 MALIGNANT NEOPLASM OF OVERLAPPING SITES OF LEFT BREAST IN FEMALE, ESTROGEN RECEPTOR POSITIVE: ICD-10-CM

## 2024-10-31 DIAGNOSIS — R41.89 COGNITIVE IMPAIRMENT: ICD-10-CM

## 2024-10-31 DIAGNOSIS — Z91.81 AT MAXIMUM RISK FOR FALL: ICD-10-CM

## 2024-10-31 DIAGNOSIS — D50.8 IRON DEFICIENCY ANEMIA SECONDARY TO INADEQUATE DIETARY IRON INTAKE: ICD-10-CM

## 2024-10-31 DIAGNOSIS — D50.8 IRON DEFICIENCY ANEMIA SECONDARY TO INADEQUATE DIETARY IRON INTAKE: Primary | ICD-10-CM

## 2024-10-31 DIAGNOSIS — Z17.0 MALIGNANT NEOPLASM OF OVERLAPPING SITES OF LEFT BREAST IN FEMALE, ESTROGEN RECEPTOR POSITIVE: ICD-10-CM

## 2024-10-31 DIAGNOSIS — Z23 IMMUNIZATION DUE: ICD-10-CM

## 2024-10-31 LAB
BASOPHILS # BLD AUTO: 0.09 K/UL (ref 0–0.2)
BASOPHILS NFR BLD: 1.2 % (ref 0–1.9)
DIFFERENTIAL METHOD BLD: ABNORMAL
EOSINOPHIL # BLD AUTO: 0.1 K/UL (ref 0–0.5)
EOSINOPHIL NFR BLD: 1.7 % (ref 0–8)
ERYTHROCYTE [DISTWIDTH] IN BLOOD BY AUTOMATED COUNT: 21 % (ref 11.5–14.5)
FERRITIN SERPL-MCNC: 5 NG/ML (ref 20–300)
HCT VFR BLD AUTO: 26.6 % (ref 37–48.5)
HGB BLD-MCNC: 7.3 G/DL (ref 12–16)
IMM GRANULOCYTES # BLD AUTO: 0.02 K/UL (ref 0–0.04)
IMM GRANULOCYTES NFR BLD AUTO: 0.3 % (ref 0–0.5)
IRON SERPL-MCNC: 10 UG/DL (ref 30–160)
LYMPHOCYTES # BLD AUTO: 1.5 K/UL (ref 1–4.8)
LYMPHOCYTES NFR BLD: 20.6 % (ref 18–48)
MCH RBC QN AUTO: 18.5 PG (ref 27–31)
MCHC RBC AUTO-ENTMCNC: 27.4 G/DL (ref 32–36)
MCV RBC AUTO: 68 FL (ref 82–98)
MONOCYTES # BLD AUTO: 0.5 K/UL (ref 0.3–1)
MONOCYTES NFR BLD: 7 % (ref 4–15)
NEUTROPHILS # BLD AUTO: 5.2 K/UL (ref 1.8–7.7)
NEUTROPHILS NFR BLD: 69.2 % (ref 38–73)
NRBC BLD-RTO: 0 /100 WBC
PLATELET # BLD AUTO: 422 K/UL (ref 150–450)
PMV BLD AUTO: 10.4 FL (ref 9.2–12.9)
RBC # BLD AUTO: 3.94 M/UL (ref 4–5.4)
SATURATED IRON: 2 % (ref 20–50)
TOTAL IRON BINDING CAPACITY: 537 UG/DL (ref 250–450)
TRANSFERRIN SERPL-MCNC: 363 MG/DL (ref 200–375)
WBC # BLD AUTO: 7.44 K/UL (ref 3.9–12.7)

## 2024-10-31 PROCEDURE — 36415 COLL VENOUS BLD VENIPUNCTURE: CPT | Mod: HCNC,PN | Performed by: FAMILY MEDICINE

## 2024-10-31 PROCEDURE — 82728 ASSAY OF FERRITIN: CPT | Mod: HCNC | Performed by: FAMILY MEDICINE

## 2024-10-31 PROCEDURE — 99999 PR PBB SHADOW E&M-EST. PATIENT-LVL III: CPT | Mod: PBBFAC,HCNC,, | Performed by: FAMILY MEDICINE

## 2024-10-31 PROCEDURE — 84466 ASSAY OF TRANSFERRIN: CPT | Mod: HCNC | Performed by: FAMILY MEDICINE

## 2024-10-31 PROCEDURE — 85025 COMPLETE CBC W/AUTO DIFF WBC: CPT | Mod: HCNC | Performed by: FAMILY MEDICINE

## 2024-11-04 ENCOUNTER — TELEPHONE (OUTPATIENT)
Dept: FAMILY MEDICINE | Facility: CLINIC | Age: 89
End: 2024-11-04
Payer: MEDICARE

## 2024-11-04 NOTE — TELEPHONE ENCOUNTER
----- Message from Bayron Pendleton MD sent at 11/4/2024  7:21 AM CST -----   Call patient's daughter, encourage them to follow back up with Hematology for discussion on iron replacement.

## 2024-11-04 NOTE — TELEPHONE ENCOUNTER
Pt daughter rt call stated that she is following up with Hematology. Pt had home health eval. Today.

## 2024-12-24 ENCOUNTER — EXTERNAL HOME HEALTH (OUTPATIENT)
Dept: HOME HEALTH SERVICES | Facility: HOSPITAL | Age: 89
End: 2024-12-24
Payer: MEDICARE

## 2024-12-27 ENCOUNTER — DOCUMENT SCAN (OUTPATIENT)
Dept: HOME HEALTH SERVICES | Facility: HOSPITAL | Age: 89
End: 2024-12-27
Payer: MEDICARE

## 2024-12-30 ENCOUNTER — DOCUMENT SCAN (OUTPATIENT)
Dept: HOME HEALTH SERVICES | Facility: HOSPITAL | Age: 89
End: 2024-12-30
Payer: MEDICARE

## 2024-12-31 ENCOUNTER — DOCUMENT SCAN (OUTPATIENT)
Dept: HOME HEALTH SERVICES | Facility: HOSPITAL | Age: 89
End: 2024-12-31
Payer: MEDICARE

## 2025-01-11 ENCOUNTER — DOCUMENT SCAN (OUTPATIENT)
Dept: HOME HEALTH SERVICES | Facility: HOSPITAL | Age: OVER 89
End: 2025-01-11
Payer: MEDICARE

## 2025-01-14 DIAGNOSIS — Z00.00 ENCOUNTER FOR MEDICARE ANNUAL WELLNESS EXAM: ICD-10-CM

## 2025-02-17 ENCOUNTER — TELEPHONE (OUTPATIENT)
Dept: ADMINISTRATIVE | Facility: CLINIC | Age: OVER 89
End: 2025-02-17
Payer: MEDICARE

## 2025-02-17 NOTE — TELEPHONE ENCOUNTER
Called pt; informed pt's daughter I was calling to make sure pt's 2/24/25 home visit for pt's eawv appt still worked for pt; pt's daughter stated as of now the appt still works; informed pt's daughter the provider would call 24-48 hours before the scheduled appt date to confirm appt time; pt's son verbalized understanding

## 2025-02-24 ENCOUNTER — OFFICE VISIT (OUTPATIENT)
Dept: HOME HEALTH SERVICES | Facility: CLINIC | Age: OVER 89
End: 2025-02-24
Payer: MEDICARE

## 2025-02-24 VITALS
DIASTOLIC BLOOD PRESSURE: 72 MMHG | HEART RATE: 77 BPM | OXYGEN SATURATION: 97 % | BODY MASS INDEX: 22.78 KG/M2 | HEIGHT: 60 IN | SYSTOLIC BLOOD PRESSURE: 180 MMHG | WEIGHT: 116 LBS

## 2025-02-24 DIAGNOSIS — K21.9 GASTROESOPHAGEAL REFLUX DISEASE, UNSPECIFIED WHETHER ESOPHAGITIS PRESENT: ICD-10-CM

## 2025-02-24 DIAGNOSIS — F41.8 ANXIETY WITH DEPRESSION: ICD-10-CM

## 2025-02-24 DIAGNOSIS — M81.0 AGE-RELATED OSTEOPOROSIS WITHOUT CURRENT PATHOLOGICAL FRACTURE: ICD-10-CM

## 2025-02-24 DIAGNOSIS — C50.812 MALIGNANT NEOPLASM OF OVERLAPPING SITES OF LEFT BREAST IN FEMALE, ESTROGEN RECEPTOR POSITIVE: ICD-10-CM

## 2025-02-24 DIAGNOSIS — Z00.00 ENCOUNTER FOR PREVENTIVE HEALTH EXAMINATION: ICD-10-CM

## 2025-02-24 DIAGNOSIS — E55.9 VITAMIN D DEFICIENCY: ICD-10-CM

## 2025-02-24 DIAGNOSIS — Z00.00 ENCOUNTER FOR MEDICARE ANNUAL WELLNESS EXAM: Primary | ICD-10-CM

## 2025-02-24 DIAGNOSIS — I10 PRIMARY HYPERTENSION: ICD-10-CM

## 2025-02-24 DIAGNOSIS — Z17.0 MALIGNANT NEOPLASM OF OVERLAPPING SITES OF LEFT BREAST IN FEMALE, ESTROGEN RECEPTOR POSITIVE: ICD-10-CM

## 2025-02-24 DIAGNOSIS — F03.90 SENILE DEMENTIA WITHOUT BEHAVIORAL DISTURBANCE: ICD-10-CM

## 2025-02-24 DIAGNOSIS — E78.49 OTHER HYPERLIPIDEMIA: ICD-10-CM

## 2025-02-25 ENCOUNTER — TELEPHONE (OUTPATIENT)
Dept: FAMILY MEDICINE | Facility: CLINIC | Age: OVER 89
End: 2025-02-25
Payer: MEDICARE

## 2025-02-25 NOTE — TELEPHONE ENCOUNTER
Patient daughter is asking if the upcoming appointment is still needed for her mother, she just had an awv at their home on yesterday and the patient is fine she's just reluctant on leaving the house now.

## 2025-02-25 NOTE — TELEPHONE ENCOUNTER
----- Message from Tammi sent at 2/25/2025 10:06 AM CST -----  Contact: Cristin Castellon  Type:  Patient Returning CallWho Called:  Lydia Castellon Left Message for Patient:  Inezchristinas the patient know what this is regarding?:  2/27 appointmentWould the patient rather a call back or a response via [employer]?   Call [facility] Call Back Number:  [phone] - JoannAdditional Information:   States she is trying to get in touch with Annika - states her sister was called in error - please call - thank you

## 2025-02-25 NOTE — TELEPHONE ENCOUNTER
----- Message from Medina sent at 2/24/2025  3:28 PM CST -----  Contact: coleman [phone]  Type: Needs Medical AdviceWho Called:  Pts daughter Yonny Call Back Number: [phone] Information: Cristin asking if appt on Thursday is necessary since pt just had AWV. Pls call back

## 2025-02-26 PROBLEM — R79.89 PRERENAL AZOTEMIA: Status: RESOLVED | Noted: 2017-06-10 | Resolved: 2025-02-26

## 2025-02-26 PROBLEM — H00.019 STYE: Status: RESOLVED | Noted: 2017-06-08 | Resolved: 2025-02-26

## 2025-02-26 NOTE — PROGRESS NOTES
Margarita Cruz presented for an initial Medicare AWV today. The following components were reviewed and updated:    Medical history  Family History  Social history  Allergies and Current Medications  Health Risk Assessment  Health Maintenance  Care Team    **See Completed Assessments for Annual Wellness visit with in the encounter summary    The following assessments were completed:  Depression Screening  Cognitive function Screening  Timed Get Up Test  Whisper Test      Opioid documentation:      Patient does not have a current opioid prescription.          Vitals:    02/24/25 0953   BP: (!) 180/72   BP Location: Right arm   Patient Position: Sitting   Pulse: 77   SpO2: 97%   Weight: 52.6 kg (116 lb)   Height: 5' (1.524 m)     Body mass index is 22.65 kg/m².       Physical Exam  Constitutional:       Appearance: Normal appearance.   HENT:      Head: Normocephalic and atraumatic.      Nose: Nose normal.      Mouth/Throat:      Mouth: Mucous membranes are moist.   Eyes:      Extraocular Movements: Extraocular movements intact.      Pupils: Pupils are equal, round, and reactive to light.   Cardiovascular:      Rate and Rhythm: Normal rate and regular rhythm.   Pulmonary:      Effort: Pulmonary effort is normal.      Breath sounds: Normal breath sounds.   Chest:   Breasts:     Left: Mass, skin change and tenderness present.   Abdominal:      General: Bowel sounds are normal.      Palpations: Abdomen is soft.   Musculoskeletal:         General: Normal range of motion.      Cervical back: Normal range of motion and neck supple.   Skin:     General: Skin is warm and dry.   Neurological:      Mental Status: She is alert.           Diagnoses and health risks identified today and associated recommendations/orders:  1. Encounter for Medicare annual wellness exam  - Ambulatory Referral/Consult to Enhanced Annual Wellness Visit (eAWV)    2. Encounter for preventive health examination  Awv completed      3. Malignant neoplasm  of overlapping sites of left breast in female, estrogen receptor positive  Has seen oncology and surgery - no treatment currently      4. Senile dementia without behavioral disturbance  Stable  - has family that checks on her       5. Anxiety with depression  No current meds      6. Other hyperlipidemia  Diet controlled  Followed with PCP      7. Primary hypertension  On norvasc - patient forgets to take it quite often      8. Vitamin D deficiency  On vit d      9. Age-related osteoporosis without current pathological fracture  Vit d      10. Gastroesophageal reflux disease, unspecified whether esophagitis present  Chronic and stable. Continue current treatment. Follow with PCP.        Provided Margarita with a 5-10 year written screening schedule and personal prevention plan. Recommendations were developed using the USPSTF age appropriate recommendations. Education, counseling, and referrals were provided as needed.  After Visit Summary printed and given to patient which includes a list of additional screenings\tests needed.    Fu in 1 yr for syeda Rg, JULIO, APRN

## 2025-02-26 NOTE — PATIENT INSTRUCTIONS
Counseling and Referral of Other Preventative  (Italic type indicates deductible and co-insurance are waived)    Patient Name: Margarita Cruz  Today's Date: 2/26/2025    Health Maintenance       Date Due Completion Date    Shingles Vaccine (1 of 2) Never done ---    Pneumococcal Vaccines (Age 50+) (1 of 2 - PCV) Never done ---    RSV Vaccine (Age 60+ and Pregnant patients) (1 - 1-dose 75+ series) Never done ---    COVID-19 Vaccine (3 - Pfizer risk series) 03/17/2021 2/17/2021    Lipid Panel 03/14/2025 3/14/2024    Hemoglobin A1c (Prediabetes) 03/14/2025 3/14/2024    TETANUS VACCINE 02/14/2034 2/14/2024        No orders of the defined types were placed in this encounter.    The following information is provided to all patients.  This information is to help you find resources for any of the problems found today that may be affecting your health:                  Living healthy guide: www.[facility].[place].[facility]      Understanding Diabetes: www.diabetes.org      Eating healthy: www.cdc.gov/healthyweight      [facility] home safety checklist: www.cdc.gov/steadi/patient.html      Agency on Aging: www.goea.[place].[facility]      Alcoholics anonymous (AA): www.aa.org      Physical Activity: www.nilsa.nih.gov/bd0hcwu      Tobacco use: www.quitwithusla.org